# Patient Record
Sex: MALE | Race: WHITE | Employment: OTHER | URBAN - METROPOLITAN AREA
[De-identification: names, ages, dates, MRNs, and addresses within clinical notes are randomized per-mention and may not be internally consistent; named-entity substitution may affect disease eponyms.]

---

## 2023-11-05 ENCOUNTER — HOSPITAL ENCOUNTER (EMERGENCY)
Age: 78
Discharge: HOME | End: 2023-11-05
Payer: MEDICARE

## 2023-11-05 VITALS
HEIGHT: 70 IN | BODY MASS INDEX: 34.36 KG/M2 | SYSTOLIC BLOOD PRESSURE: 189 MMHG | TEMPERATURE: 97.4 F | WEIGHT: 240 LBS | DIASTOLIC BLOOD PRESSURE: 94 MMHG | OXYGEN SATURATION: 96 % | HEART RATE: 60 BPM | RESPIRATION RATE: 14 BRPM

## 2023-11-05 DIAGNOSIS — H60.391 INFECTIVE OTITIS EXTERNA OF RIGHT EAR: Primary | ICD-10-CM

## 2023-11-05 PROCEDURE — 99283 EMERGENCY DEPT VISIT LOW MDM: CPT

## 2023-11-05 RX ORDER — LEVOTHYROXINE SODIUM 0.05 MG/1
TABLET ORAL
COMMUNITY
Start: 2023-09-23

## 2023-11-05 RX ORDER — AMLODIPINE BESYLATE 5 MG/1
TABLET ORAL
COMMUNITY
Start: 2023-09-07

## 2023-11-05 RX ORDER — APIXABAN 5 MG/1
TABLET, FILM COATED ORAL
COMMUNITY
Start: 2023-09-23

## 2023-11-05 RX ORDER — HYDROCODONE BITARTRATE AND ACETAMINOPHEN 5; 325 MG/1; MG/1
TABLET ORAL
COMMUNITY
Start: 2023-10-20

## 2023-11-05 RX ORDER — LISINOPRIL 2.5 MG/1
TABLET ORAL
COMMUNITY
Start: 2023-09-23

## 2023-11-05 RX ORDER — TADALAFIL 5 MG/1
5 TABLET ORAL DAILY
COMMUNITY
Start: 2023-09-13

## 2023-11-05 RX ORDER — ATORVASTATIN CALCIUM 80 MG/1
1 TABLET, FILM COATED ORAL
COMMUNITY
Start: 2020-08-04

## 2023-11-05 RX ORDER — TAMSULOSIN HYDROCHLORIDE 0.4 MG/1
0.8 CAPSULE ORAL DAILY
COMMUNITY
Start: 2023-07-22

## 2023-11-05 RX ORDER — OMEPRAZOLE 40 MG/1
CAPSULE, DELAYED RELEASE ORAL
COMMUNITY
Start: 2023-09-23

## 2023-11-05 RX ORDER — FENOFIBRATE 160 MG/1
TABLET ORAL
COMMUNITY
Start: 2023-11-01

## 2023-11-05 RX ORDER — ASPIRIN 81 MG/1
81 TABLET, CHEWABLE ORAL DAILY
COMMUNITY

## 2023-11-05 RX ORDER — AMOXICILLIN AND CLAVULANATE POTASSIUM 875; 125 MG/1; MG/1
TABLET, FILM COATED ORAL
COMMUNITY
Start: 2023-11-02

## 2023-11-05 RX ORDER — HYDROCODONE BITARTRATE AND ACETAMINOPHEN 5; 325 MG/1; MG/1
1 TABLET ORAL EVERY 6 HOURS PRN
Qty: 10 TABLET | Refills: 0 | Status: SHIPPED | OUTPATIENT
Start: 2023-11-05 | End: 2023-11-08

## 2023-11-05 RX ORDER — FINASTERIDE 5 MG/1
5 TABLET, FILM COATED ORAL DAILY
COMMUNITY

## 2023-11-05 ASSESSMENT — PAIN - FUNCTIONAL ASSESSMENT
PAIN_FUNCTIONAL_ASSESSMENT: NONE - DENIES PAIN
PAIN_FUNCTIONAL_ASSESSMENT: 0-10

## 2023-11-05 ASSESSMENT — PAIN DESCRIPTION - PAIN TYPE: TYPE: ACUTE PAIN

## 2023-11-05 ASSESSMENT — PAIN DESCRIPTION - ORIENTATION: ORIENTATION: RIGHT

## 2023-11-05 ASSESSMENT — PAIN SCALES - GENERAL: PAINLEVEL_OUTOF10: 9

## 2023-11-05 ASSESSMENT — PAIN DESCRIPTION - LOCATION: LOCATION: EAR

## 2023-11-05 NOTE — ED NOTES
Discharge instructions reviewed without questions. No further needs voiced at this time. Ambulatory from department in stable condition.        Kristy Wilks RN  11/05/23 1038

## 2023-11-10 NOTE — ED PROVIDER NOTES
[address] [place] [address]  ED  EMERGENCY DEPARTMENT ENCOUNTER        Pt Name: Sunday Johnson  MRN: [id number]  [address] [facility] 1945  Date of evaluation: 11/5/2023  Provider: TRACIE Mirza - CNP  PCP: Rosa Michelle MD        SIGIFREDO. I have evaluated this patient. CHIEF COMPLAINT       Chief Complaint   Patient presents with    Otalgia     Sx for 3 weeks. Has seen multiple people and has been on antibiotics. Pt was seen today already at an urgent care prior to coming to the ER       HISTORY OF PRESENT ILLNESS: 1 or more Elements     History From: the patient  Limitations to history : None    Sunday Johnson is a 69 y.o. male who presents to the emergency room today with complaints of chronic ear pain, patient states that he had symptoms for several weeks, he is already been on several different antibiotics, he has seen ENT as well. Patient unable to get into ENT, he states that he is here to get some pain relief because he is going out of town. He is here for further evaluation. Nursing Notes were all reviewed and agreed with or any disagreements were addressed in the HPI. REVIEW OF SYSTEMS :      Review of Systems    Positives and Pertinent negatives as per HPI.      SURGICAL HISTORY     Past Surgical History:   Procedure Laterality Date    CARDIAC SURGERY         CURRENTMEDICATIONS       Discharge Medication List as of 11/5/2023 10:33 AM        CONTINUE these medications which have NOT CHANGED    Details   tamsulosin (FLOMAX) 0.4 MG capsule Take 2 capsules by mouth dailyHistorical Med      tadalafil (CIALIS) 5 MG tablet Take 1 tablet by mouth dailyHistorical Med      atorvastatin (LIPITOR) 80 MG tablet Take 1 tablet by mouthHistorical Med      finasteride (PROSCAR) 5 MG tablet Take 1 tablet by mouth dailyHistorical Med      ELIQUIS 5 MG TABS tablet DAWHistorical Med      levothyroxine (SYNTHROID) 50 MCG tablet Historical Med      lisinopril (PRINIVIL;ZESTRIL) 2.5 MG tablet

## 2023-12-14 ENCOUNTER — OFFICE VISIT (OUTPATIENT)
Dept: PRIMARY CARE CLINIC | Age: 78
End: 2023-12-14
Payer: MEDICARE

## 2023-12-14 VITALS
OXYGEN SATURATION: 98 % | BODY MASS INDEX: 35.76 KG/M2 | RESPIRATION RATE: 16 BRPM | WEIGHT: 249.8 LBS | DIASTOLIC BLOOD PRESSURE: 84 MMHG | TEMPERATURE: 98.3 F | SYSTOLIC BLOOD PRESSURE: 128 MMHG | HEIGHT: 70 IN | HEART RATE: 70 BPM

## 2023-12-14 DIAGNOSIS — E11.9 TYPE 2 DIABETES MELLITUS WITHOUT COMPLICATION, WITHOUT LONG-TERM CURRENT USE OF INSULIN (HCC): Primary | ICD-10-CM

## 2023-12-14 DIAGNOSIS — I10 ESSENTIAL HYPERTENSION: ICD-10-CM

## 2023-12-14 DIAGNOSIS — N40.0 BENIGN PROSTATIC HYPERPLASIA WITHOUT LOWER URINARY TRACT SYMPTOMS: ICD-10-CM

## 2023-12-14 DIAGNOSIS — K21.9 GASTROESOPHAGEAL REFLUX DISEASE, UNSPECIFIED WHETHER ESOPHAGITIS PRESENT: ICD-10-CM

## 2023-12-14 DIAGNOSIS — I48.91 ATRIAL FIBRILLATION, UNSPECIFIED TYPE (HCC): ICD-10-CM

## 2023-12-14 DIAGNOSIS — I25.10 CORONARY ARTERY DISEASE INVOLVING NATIVE CORONARY ARTERY OF NATIVE HEART WITHOUT ANGINA PECTORIS: ICD-10-CM

## 2023-12-14 DIAGNOSIS — E78.2 MIXED HYPERLIPIDEMIA: ICD-10-CM

## 2023-12-14 DIAGNOSIS — Z23 NEED FOR TDAP VACCINATION: ICD-10-CM

## 2023-12-14 DIAGNOSIS — E03.9 ACQUIRED HYPOTHYROIDISM: ICD-10-CM

## 2023-12-14 PROCEDURE — G8417 CALC BMI ABV UP PARAM F/U: HCPCS | Performed by: INTERNAL MEDICINE

## 2023-12-14 PROCEDURE — 3074F SYST BP LT 130 MM HG: CPT | Performed by: INTERNAL MEDICINE

## 2023-12-14 PROCEDURE — G8427 DOCREV CUR MEDS BY ELIG CLIN: HCPCS | Performed by: INTERNAL MEDICINE

## 2023-12-14 PROCEDURE — 99204 OFFICE O/P NEW MOD 45 MIN: CPT | Performed by: INTERNAL MEDICINE

## 2023-12-14 PROCEDURE — 3079F DIAST BP 80-89 MM HG: CPT | Performed by: INTERNAL MEDICINE

## 2023-12-14 PROCEDURE — 1036F TOBACCO NON-USER: CPT | Performed by: INTERNAL MEDICINE

## 2023-12-14 PROCEDURE — G8484 FLU IMMUNIZE NO ADMIN: HCPCS | Performed by: INTERNAL MEDICINE

## 2023-12-14 PROCEDURE — 1123F ACP DISCUSS/DSCN MKR DOCD: CPT | Performed by: INTERNAL MEDICINE

## 2023-12-14 RX ORDER — LOSARTAN POTASSIUM 25 MG/1
25 TABLET ORAL DAILY
Qty: 30 TABLET | Refills: 0 | Status: SHIPPED | OUTPATIENT
Start: 2023-12-14

## 2023-12-14 RX ORDER — ZINC GLUCONATE 50 MG
TABLET ORAL
COMMUNITY

## 2023-12-14 RX ORDER — MULTIVIT-MIN/IRON/FOLIC ACID/K 18-600-40
CAPSULE ORAL 2 TIMES DAILY
COMMUNITY

## 2023-12-31 PROBLEM — I10 ESSENTIAL HYPERTENSION: Status: ACTIVE | Noted: 2023-12-31

## 2023-12-31 PROBLEM — E11.9 TYPE 2 DIABETES MELLITUS WITHOUT COMPLICATION, WITHOUT LONG-TERM CURRENT USE OF INSULIN (HCC): Status: ACTIVE | Noted: 2023-12-31

## 2023-12-31 PROBLEM — E03.9 ACQUIRED HYPOTHYROIDISM: Status: ACTIVE | Noted: 2023-12-31

## 2023-12-31 PROBLEM — N40.0 BENIGN PROSTATIC HYPERPLASIA WITHOUT LOWER URINARY TRACT SYMPTOMS: Status: ACTIVE | Noted: 2023-12-31

## 2023-12-31 PROBLEM — E78.2 MIXED HYPERLIPIDEMIA: Status: ACTIVE | Noted: 2023-12-31

## 2023-12-31 PROBLEM — I25.10 CORONARY ARTERY DISEASE INVOLVING NATIVE CORONARY ARTERY OF NATIVE HEART WITHOUT ANGINA PECTORIS: Status: ACTIVE | Noted: 2023-12-31

## 2023-12-31 PROBLEM — I48.91 ATRIAL FIBRILLATION (HCC): Status: ACTIVE | Noted: 2023-12-31

## 2023-12-31 PROBLEM — K21.9 GASTROESOPHAGEAL REFLUX DISEASE: Status: ACTIVE | Noted: 2023-12-31

## 2024-01-22 NOTE — PROGRESS NOTES
tablet by mouth (Patient not taking: Reported on 1/23/2024)      amoxicillin-clavulanate (AUGMENTIN) 875-125 MG per tablet  (Patient not taking: Reported on 1/23/2024)       No facility-administered encounter medications on file as of 1/23/2024.        Lab Data:  CBC:   No results found for: \"WBC\", \"HGB\", \"HCT\", \"MCV\", \"PLT\"    BMP:   Lab Results   Component Value Date/Time     12/18/2023 09:56 AM    K 3.9 12/18/2023 09:56 AM     12/18/2023 09:56 AM    CO2 27 12/18/2023 09:56 AM    BUN 21 12/18/2023 09:56 AM    CREATININE 1.2 12/18/2023 09:56 AM    GLUCOSE 83 12/18/2023 09:56 AM    CALCIUM 8.8 12/18/2023 09:56 AM    LABGLOM >60 12/18/2023 09:56 AM        LIVER PROFILE:   Lab Results   Component Value Date    ALT 18 12/18/2023    AST 22 12/18/2023    ALKPHOS 42 12/18/2023    BILITOT 0.5 12/18/2023       LIPID:   Lab Results   Component Value Date    CHOL 113 12/18/2023     Lab Results   Component Value Date    TRIG 196 (H) 12/18/2023     Lab Results   Component Value Date    HDL 24 (L) 12/18/2023     Lab Results   Component Value Date    LDLCALC 50 12/18/2023     Lab Results   Component Value Date    LABVLDL 39 12/18/2023     No results found for: \"CHOLHDLRATIO\"  PT/INR: No results for input(s): \"PROTIME\", \"INR\" in the last 72 hours.  A1C: No results found for: \"LABA1C\"  BNP:  No results for input(s): \"BNP\" in the last 72 hours.    IMAGING:   EKG 1/23/24  NSR 69 bpm   Within normal limits  Assessment:  1. Essential hypertension BP borderline High but was 128/84 on 12.14.23 at PCP office will continue losartan and he is scheduled for follow up with them soon.  Continue amlodipine 5 mg once daily   -Continue Losartan  cough is better   2. Coronary artery disease involving native coronary artery of native heart without angina pectoris   -Continue atorvastatin 80 mg once daily  -Continue aspirin 81 mg once daily   Will continue to monitor his progress no indication for stress test at this time     3. Proximal

## 2024-01-23 ENCOUNTER — OFFICE VISIT (OUTPATIENT)
Dept: CARDIOLOGY CLINIC | Age: 79
End: 2024-01-23
Payer: MEDICARE

## 2024-01-23 VITALS
DIASTOLIC BLOOD PRESSURE: 80 MMHG | HEART RATE: 74 BPM | BODY MASS INDEX: 36.79 KG/M2 | WEIGHT: 257 LBS | OXYGEN SATURATION: 96 % | HEIGHT: 70 IN | SYSTOLIC BLOOD PRESSURE: 145 MMHG

## 2024-01-23 DIAGNOSIS — R73.03 PRE-DIABETES: ICD-10-CM

## 2024-01-23 DIAGNOSIS — K21.9 GASTROESOPHAGEAL REFLUX DISEASE, UNSPECIFIED WHETHER ESOPHAGITIS PRESENT: ICD-10-CM

## 2024-01-23 DIAGNOSIS — I25.10 CORONARY ARTERY DISEASE INVOLVING NATIVE CORONARY ARTERY OF NATIVE HEART WITHOUT ANGINA PECTORIS: ICD-10-CM

## 2024-01-23 DIAGNOSIS — E11.9 TYPE 2 DIABETES MELLITUS WITHOUT COMPLICATION, WITHOUT LONG-TERM CURRENT USE OF INSULIN (HCC): ICD-10-CM

## 2024-01-23 DIAGNOSIS — I10 ESSENTIAL HYPERTENSION: Primary | ICD-10-CM

## 2024-01-23 DIAGNOSIS — Z76.89 ESTABLISHING CARE WITH NEW DOCTOR, ENCOUNTER FOR: ICD-10-CM

## 2024-01-23 DIAGNOSIS — I48.91 ATRIAL FIBRILLATION, UNSPECIFIED TYPE (HCC): ICD-10-CM

## 2024-01-23 DIAGNOSIS — E78.2 MIXED HYPERLIPIDEMIA: ICD-10-CM

## 2024-01-23 PROCEDURE — 99204 OFFICE O/P NEW MOD 45 MIN: CPT | Performed by: INTERNAL MEDICINE

## 2024-01-23 PROCEDURE — G8427 DOCREV CUR MEDS BY ELIG CLIN: HCPCS | Performed by: INTERNAL MEDICINE

## 2024-01-23 PROCEDURE — 3079F DIAST BP 80-89 MM HG: CPT | Performed by: INTERNAL MEDICINE

## 2024-01-23 PROCEDURE — G8484 FLU IMMUNIZE NO ADMIN: HCPCS | Performed by: INTERNAL MEDICINE

## 2024-01-23 PROCEDURE — G8417 CALC BMI ABV UP PARAM F/U: HCPCS | Performed by: INTERNAL MEDICINE

## 2024-01-23 PROCEDURE — 3077F SYST BP >= 140 MM HG: CPT | Performed by: INTERNAL MEDICINE

## 2024-01-23 PROCEDURE — 93000 ELECTROCARDIOGRAM COMPLETE: CPT | Performed by: INTERNAL MEDICINE

## 2024-01-23 RX ORDER — SIMVASTATIN 20 MG
20 TABLET ORAL NIGHTLY
COMMUNITY
End: 2024-01-23

## 2024-01-23 RX ORDER — ATORVASTATIN CALCIUM 80 MG/1
80 TABLET, FILM COATED ORAL DAILY
Qty: 90 TABLET | Refills: 3 | Status: SHIPPED | OUTPATIENT
Start: 2024-01-23

## 2024-01-23 RX ORDER — LISINOPRIL 2.5 MG/1
2.5 TABLET ORAL DAILY
COMMUNITY
Start: 2021-04-20 | End: 2024-01-23 | Stop reason: ALTCHOICE

## 2024-01-23 NOTE — PATIENT INSTRUCTIONS
Plan:  We will request your cardiac medical notes from [facility] in [place].   Current medications reviewed.  No changes at this time. Refills given as warranted.   Lab work from 12/18/23 reviewed  Patient wants to monitor heart rate on apple watch at this time. Will consider 30 cardiac monitor in 3 months time prior to next office visit.   Follow up with me in 6 months

## 2024-01-24 ENCOUNTER — OFFICE VISIT (OUTPATIENT)
Dept: PRIMARY CARE CLINIC | Age: 79
End: 2024-01-24
Payer: MEDICARE

## 2024-01-24 VITALS
RESPIRATION RATE: 16 BRPM | WEIGHT: 255 LBS | HEIGHT: 70 IN | SYSTOLIC BLOOD PRESSURE: 128 MMHG | BODY MASS INDEX: 36.51 KG/M2 | DIASTOLIC BLOOD PRESSURE: 88 MMHG | TEMPERATURE: 97.7 F | OXYGEN SATURATION: 95 % | HEART RATE: 72 BPM

## 2024-01-24 DIAGNOSIS — N52.9 ERECTILE DYSFUNCTION, UNSPECIFIED ERECTILE DYSFUNCTION TYPE: ICD-10-CM

## 2024-01-24 DIAGNOSIS — T46.4X5A COUGH DUE TO ACE INHIBITOR: ICD-10-CM

## 2024-01-24 DIAGNOSIS — Z23 NEED FOR TDAP VACCINATION: ICD-10-CM

## 2024-01-24 DIAGNOSIS — I10 ESSENTIAL HYPERTENSION: Primary | ICD-10-CM

## 2024-01-24 DIAGNOSIS — N40.0 BENIGN PROSTATIC HYPERPLASIA WITHOUT LOWER URINARY TRACT SYMPTOMS: ICD-10-CM

## 2024-01-24 DIAGNOSIS — R80.9 MICROALBUMINURIA: ICD-10-CM

## 2024-01-24 DIAGNOSIS — R05.8 COUGH DUE TO ACE INHIBITOR: ICD-10-CM

## 2024-01-24 PROCEDURE — 1123F ACP DISCUSS/DSCN MKR DOCD: CPT | Performed by: INTERNAL MEDICINE

## 2024-01-24 PROCEDURE — 3079F DIAST BP 80-89 MM HG: CPT | Performed by: INTERNAL MEDICINE

## 2024-01-24 PROCEDURE — G8484 FLU IMMUNIZE NO ADMIN: HCPCS | Performed by: INTERNAL MEDICINE

## 2024-01-24 PROCEDURE — G8417 CALC BMI ABV UP PARAM F/U: HCPCS | Performed by: INTERNAL MEDICINE

## 2024-01-24 PROCEDURE — G8427 DOCREV CUR MEDS BY ELIG CLIN: HCPCS | Performed by: INTERNAL MEDICINE

## 2024-01-24 PROCEDURE — 99214 OFFICE O/P EST MOD 30 MIN: CPT | Performed by: INTERNAL MEDICINE

## 2024-01-24 PROCEDURE — 1036F TOBACCO NON-USER: CPT | Performed by: INTERNAL MEDICINE

## 2024-01-24 PROCEDURE — 3074F SYST BP LT 130 MM HG: CPT | Performed by: INTERNAL MEDICINE

## 2024-01-24 RX ORDER — LOSARTAN POTASSIUM 25 MG/1
25 TABLET ORAL DAILY
Qty: 90 TABLET | Refills: 0 | Status: SHIPPED | OUTPATIENT
Start: 2024-01-24

## 2024-01-24 RX ORDER — TADALAFIL 20 MG/1
20 TABLET ORAL PRN
Qty: 30 TABLET | Refills: 0 | Status: SHIPPED | OUTPATIENT
Start: 2024-01-24

## 2024-01-24 RX ORDER — TADALAFIL 5 MG/1
5 TABLET ORAL DAILY
Qty: 90 TABLET | Refills: 0 | Status: SHIPPED | OUTPATIENT
Start: 2024-01-24 | End: 2024-01-25 | Stop reason: SDUPTHER

## 2024-01-24 ASSESSMENT — PATIENT HEALTH QUESTIONNAIRE - PHQ9
SUM OF ALL RESPONSES TO PHQ QUESTIONS 1-9: 0
SUM OF ALL RESPONSES TO PHQ9 QUESTIONS 1 & 2: 0
2. FEELING DOWN, DEPRESSED OR HOPELESS: 0
1. LITTLE INTEREST OR PLEASURE IN DOING THINGS: 0
SUM OF ALL RESPONSES TO PHQ QUESTIONS 1-9: 0
SUM OF ALL RESPONSES TO PHQ QUESTIONS 1-9: 0

## 2024-01-24 NOTE — PROGRESS NOTES
Albumin 12/18/2023 4.1     Albumin/Globulin Ratio 12/18/2023 2.0     Total Bilirubin 12/18/2023 0.5     Alkaline Phosphatase 12/18/2023 42     ALT 12/18/2023 18     AST 12/18/2023 22     Cholesterol, Total 12/18/2023 113     Triglycerides 12/18/2023 196 (H)     HDL 12/18/2023 24 (L)     LDL Calculated 12/18/2023 50     VLDL Cholesterol Calcula* 12/18/2023 39     Microalbumin, Random Uri* 12/18/2023 5.40 (H)     Creatinine, Ur 12/18/2023 66.0     Microalbumin Creatinine * 12/18/2023 81.8 (H)     PSA 12/18/2023 0.71            Medications, Allergies, Social history, Medical history, and results reviewed      An electronic signature was used to authenticate this note.    -Piper Veliz MD

## 2024-01-25 ENCOUNTER — PATIENT MESSAGE (OUTPATIENT)
Dept: PRIMARY CARE CLINIC | Age: 79
End: 2024-01-25

## 2024-01-25 DIAGNOSIS — N40.0 BENIGN PROSTATIC HYPERPLASIA WITHOUT LOWER URINARY TRACT SYMPTOMS: ICD-10-CM

## 2024-01-25 RX ORDER — TADALAFIL 5 MG/1
5 TABLET ORAL DAILY
Qty: 30 TABLET | Refills: 1 | Status: SHIPPED | OUTPATIENT
Start: 2024-01-25

## 2024-01-25 NOTE — TELEPHONE ENCOUNTER
From: Jimmy Hubbard  To: Dr. Piper Veliz  Sent: 1/25/2024 11:06 AM EST  Subject: Prescriptions     Dr Veliz  Got a message from [employer] that Tadalafil 5mg is not approved. Need you to send prescription to [facility].  Let me know when it’s done and I can .  Thanks  Jimmy Hubbard

## 2024-02-08 PROBLEM — R05.8 COUGH DUE TO ACE INHIBITOR: Status: ACTIVE | Noted: 2024-02-08

## 2024-02-08 PROBLEM — N52.9 ERECTILE DYSFUNCTION: Status: ACTIVE | Noted: 2024-02-08

## 2024-02-08 PROBLEM — R80.9 MICROALBUMINURIA: Status: ACTIVE | Noted: 2024-02-08

## 2024-02-08 PROBLEM — T46.4X5A COUGH DUE TO ACE INHIBITOR: Status: ACTIVE | Noted: 2024-02-08

## 2024-02-08 ASSESSMENT — ENCOUNTER SYMPTOMS
BLOOD IN STOOL: 0
SINUS PRESSURE: 0
DIARRHEA: 0
COUGH: 1
TROUBLE SWALLOWING: 0
CONSTIPATION: 0
WHEEZING: 0
SHORTNESS OF BREATH: 0
SORE THROAT: 0
NAUSEA: 0
CHEST TIGHTNESS: 0
SINUS PAIN: 0
ABDOMINAL PAIN: 0
VOMITING: 0
RHINORRHEA: 0

## 2024-02-23 ENCOUNTER — TELEPHONE (OUTPATIENT)
Dept: PRIMARY CARE CLINIC | Age: 79
End: 2024-02-23

## 2024-02-23 NOTE — TELEPHONE ENCOUNTER
[facility] [address] [facility] calling to see if the genetic testing form can be sent back to them today   Fax # [phone]

## 2024-02-23 NOTE — TELEPHONE ENCOUNTER
Dr. Veliz wanted to verify that patient wants this testing. He did return call and agree to it. Form placed in Dr. Veliz's inbox for review.

## 2024-03-26 RX ORDER — LOSARTAN POTASSIUM 25 MG/1
25 TABLET ORAL DAILY
Qty: 90 TABLET | Refills: 2 | Status: SHIPPED | OUTPATIENT
Start: 2024-03-26

## 2024-03-26 NOTE — TELEPHONE ENCOUNTER
Medication:   Requested Prescriptions     Pending Prescriptions Disp Refills    losartan (COZAAR) 25 MG tablet [Pharmacy Med Name: Losartan Potassium 25 MG Oral Tablet] 90 tablet 3     Sig: TAKE 1 TABLET BY MOUTH DAILY     Last Filled:  1.24.24    Last appt: 1/24/2024   Next appt: 4/25/2024    Last OARRS:        No data to display

## 2024-04-15 ENCOUNTER — TELEPHONE (OUTPATIENT)
Dept: PRIMARY CARE CLINIC | Age: 79
End: 2024-04-15

## 2024-04-25 ENCOUNTER — OFFICE VISIT (OUTPATIENT)
Dept: PRIMARY CARE CLINIC | Age: 79
End: 2024-04-25
Payer: MEDICARE

## 2024-04-25 VITALS
HEART RATE: 76 BPM | WEIGHT: 247 LBS | RESPIRATION RATE: 16 BRPM | HEIGHT: 70 IN | TEMPERATURE: 97.4 F | DIASTOLIC BLOOD PRESSURE: 86 MMHG | BODY MASS INDEX: 35.36 KG/M2 | OXYGEN SATURATION: 96 % | SYSTOLIC BLOOD PRESSURE: 130 MMHG

## 2024-04-25 DIAGNOSIS — E03.9 ACQUIRED HYPOTHYROIDISM: ICD-10-CM

## 2024-04-25 DIAGNOSIS — E11.9 TYPE 2 DIABETES MELLITUS WITHOUT COMPLICATION, WITHOUT LONG-TERM CURRENT USE OF INSULIN (HCC): ICD-10-CM

## 2024-04-25 DIAGNOSIS — N52.9 ERECTILE DYSFUNCTION, UNSPECIFIED ERECTILE DYSFUNCTION TYPE: ICD-10-CM

## 2024-04-25 DIAGNOSIS — Z00.00 INITIAL MEDICARE ANNUAL WELLNESS VISIT: Primary | ICD-10-CM

## 2024-04-25 DIAGNOSIS — I10 ESSENTIAL HYPERTENSION: ICD-10-CM

## 2024-04-25 DIAGNOSIS — R80.9 MICROALBUMINURIA: ICD-10-CM

## 2024-04-25 DIAGNOSIS — E78.2 MIXED HYPERLIPIDEMIA: ICD-10-CM

## 2024-04-25 PROCEDURE — 1123F ACP DISCUSS/DSCN MKR DOCD: CPT | Performed by: INTERNAL MEDICINE

## 2024-04-25 PROCEDURE — G0438 PPPS, INITIAL VISIT: HCPCS | Performed by: INTERNAL MEDICINE

## 2024-04-25 PROCEDURE — 3079F DIAST BP 80-89 MM HG: CPT | Performed by: INTERNAL MEDICINE

## 2024-04-25 PROCEDURE — 3075F SYST BP GE 130 - 139MM HG: CPT | Performed by: INTERNAL MEDICINE

## 2024-04-25 PROCEDURE — 3044F HG A1C LEVEL LT 7.0%: CPT | Performed by: INTERNAL MEDICINE

## 2024-04-25 ASSESSMENT — LIFESTYLE VARIABLES
HOW MANY STANDARD DRINKS CONTAINING ALCOHOL DO YOU HAVE ON A TYPICAL DAY: PATIENT DOES NOT DRINK
HOW OFTEN DO YOU HAVE A DRINK CONTAINING ALCOHOL: NEVER

## 2024-04-25 ASSESSMENT — PATIENT HEALTH QUESTIONNAIRE - PHQ9
SUM OF ALL RESPONSES TO PHQ9 QUESTIONS 1 & 2: 0
1. LITTLE INTEREST OR PLEASURE IN DOING THINGS: NOT AT ALL
SUM OF ALL RESPONSES TO PHQ QUESTIONS 1-9: 0
2. FEELING DOWN, DEPRESSED OR HOPELESS: NOT AT ALL

## 2024-04-25 NOTE — PROGRESS NOTES
Medicare Annual Wellness Visit    Jimmy Hubbard is here for Medicare AWV    Assessment & Plan     1. Initial Medicare annual wellness visit  -Medicare AWV done    2. Essential hypertension  -Stable  -Continue amlodipine 5 mg once daily   -Continue losartan 25 mg once daily  -Low sodium diet  -Regular aerobic exercise   -Comprehensive Metabolic Panel; Future    3. Type 2 diabetes mellitus without complication, without long-term current use of insulin (HCC)  -stable  -Continue diet control  -Limit carbohydrates to 45 grams with meals and 15 grams with snacks  -monitor blood sugars  -goal for blood sugar fasting or pre-meal  is   -goal for blood sugar 2 hours after a meal is less than 180  -goal for blood sugar at bedtime is less than 150  -Regular aerobic exercise  -Comprehensive Metabolic Panel; Future  -Hemoglobin A1C; Future    4. Mixed hyperlipidemia  -stable  -LDL is at goal   -Continue atorvastatin 80 mg daily  -Low fat, low cholesterol diet  -Regular aerobic exercise  -Comprehensive Metabolic Panel; Future  -Lipid Panel; Future    5. Acquired hypothyroidism  -stable  -Continue levothyroxine 50 mcg once daily  -TSH; Future    6. Erectile dysfunction, unspecified erectile dysfunction type  -stable  -continue Cialis as needed    7. Microalbuminuria  -Continue Losartan 25mg once daily  -Microalbumin / Creatinine Urine Ratio; Future        Recommendations for Preventive Services Due: see orders and patient instructions/AVS.  Recommended screening schedule for the next 5-10 years is provided to the patient in written form: see Patient Instructions/AVS.         Return in about 6 months (around 10/25/2024) for diabetes, hypertension, hyperlipidemia, hypothyroidism, .           Subjective   The following acute and/or chronic problems were also addressed today:    Patient has Type 2 Diabetes. Patient is diet controlled. Patient decreases carbohydrates.     Patient has hypertension. Patient takes amlodipine 5

## 2024-04-30 ENCOUNTER — HOSPITAL ENCOUNTER (OUTPATIENT)
Age: 79
Discharge: HOME | End: 2024-04-30
Payer: MEDICARE

## 2024-04-30 DIAGNOSIS — I10 ESSENTIAL HYPERTENSION: ICD-10-CM

## 2024-04-30 DIAGNOSIS — E11.9 TYPE 2 DIABETES MELLITUS WITHOUT COMPLICATION, WITHOUT LONG-TERM CURRENT USE OF INSULIN (HCC): ICD-10-CM

## 2024-04-30 DIAGNOSIS — E78.2 MIXED HYPERLIPIDEMIA: ICD-10-CM

## 2024-04-30 DIAGNOSIS — E03.9 ACQUIRED HYPOTHYROIDISM: ICD-10-CM

## 2024-04-30 DIAGNOSIS — R80.9 MICROALBUMINURIA: ICD-10-CM

## 2024-04-30 LAB
ALBUMIN SERPL-MCNC: 4.1 G/DL (ref 3.4–5)
ALBUMIN/GLOB SERPL: 1.7 {RATIO} (ref 1.1–2.2)
ALP SERPL-CCNC: 40 U/L (ref 40–129)
ALT SERPL-CCNC: 24 U/L (ref 10–40)
ANION GAP SERPL CALCULATED.3IONS-SCNC: 8 MMOL/L (ref 3–16)
AST SERPL-CCNC: 21 U/L (ref 15–37)
BILIRUB SERPL-MCNC: 0.5 MG/DL (ref 0–1)
BUN SERPL-MCNC: 20 MG/DL (ref 7–20)
CALCIUM SERPL-MCNC: 9.4 MG/DL (ref 8.3–10.6)
CHLORIDE SERPL-SCNC: 102 MMOL/L (ref 99–110)
CHOLEST SERPL-MCNC: 152 MG/DL (ref 0–199)
CO2 SERPL-SCNC: 27 MMOL/L (ref 21–32)
CREAT SERPL-MCNC: 1.3 MG/DL (ref 0.8–1.3)
CREAT UR-MCNC: 58.2 MG/DL (ref 39–259)
GFR SERPLBLD CREATININE-BSD FMLA CKD-EPI: 56 ML/MIN/{1.73_M2}
GLUCOSE SERPL-MCNC: 119 MG/DL (ref 70–99)
HDLC SERPL-MCNC: 31 MG/DL (ref 40–60)
LDLC SERPL CALC-MCNC: 87 MG/DL
MICROALBUMIN UR DL<=1MG/L-MCNC: 4.6 MG/DL
MICROALBUMIN/CREAT UR: 79 MG/G (ref 0–30)
POTASSIUM SERPL-SCNC: 4.3 MMOL/L (ref 3.5–5.1)
PROT SERPL-MCNC: 6.5 G/DL (ref 6.4–8.2)
SODIUM SERPL-SCNC: 137 MMOL/L (ref 136–145)
TRIGL SERPL-MCNC: 170 MG/DL (ref 0–150)
TSH SERPL DL<=0.005 MIU/L-ACNC: 2.77 UIU/ML (ref 0.27–4.2)
VLDLC SERPL CALC-MCNC: 34 MG/DL

## 2024-04-30 PROCEDURE — 82570 ASSAY OF URINE CREATININE: CPT

## 2024-04-30 PROCEDURE — 80061 LIPID PANEL: CPT

## 2024-04-30 PROCEDURE — 36415 COLL VENOUS BLD VENIPUNCTURE: CPT

## 2024-04-30 PROCEDURE — 83036 HEMOGLOBIN GLYCOSYLATED A1C: CPT

## 2024-04-30 PROCEDURE — 82043 UR ALBUMIN QUANTITATIVE: CPT

## 2024-04-30 PROCEDURE — 84443 ASSAY THYROID STIM HORMONE: CPT

## 2024-04-30 PROCEDURE — 80053 COMPREHEN METABOLIC PANEL: CPT

## 2024-05-01 LAB
EST. AVERAGE GLUCOSE BLD GHB EST-MCNC: 131.2 MG/DL
HBA1C MFR BLD: 6.2 %

## 2024-05-07 ENCOUNTER — PATIENT MESSAGE (OUTPATIENT)
Dept: PRIMARY CARE CLINIC | Age: 79
End: 2024-05-07

## 2024-05-07 RX ORDER — LEVOTHYROXINE SODIUM 0.05 MG/1
50 TABLET ORAL DAILY
Qty: 90 TABLET | Refills: 1 | Status: SHIPPED | OUTPATIENT
Start: 2024-05-07

## 2024-05-07 NOTE — TELEPHONE ENCOUNTER
Medication:   Requested Prescriptions     Pending Prescriptions Disp Refills    levothyroxine (SYNTHROID) 50 MCG tablet 90 tablet 1     Sig: Take 1 tablet by mouth Daily     Last Filled:  9.23.23    Last appt: 4/25/2024   Next appt: 10/28/2024    Last OARRS:        No data to display

## 2024-05-07 NOTE — TELEPHONE ENCOUNTER
From: Jimmy Hubbard  To: Dr. Piper Veliz  Sent: 5/7/2024 2:21 PM EDT  Subject: levothyroxine 50 MCG tablet Refill    Got message from [facility] that this prescription needs to have your approval for a refill. Can you send them a new prescription? Thanks

## 2024-05-13 DIAGNOSIS — E11.9 TYPE 2 DIABETES MELLITUS WITHOUT COMPLICATION, WITHOUT LONG-TERM CURRENT USE OF INSULIN (HCC): ICD-10-CM

## 2024-05-13 DIAGNOSIS — R80.9 MICROALBUMINURIA: Primary | ICD-10-CM

## 2024-05-13 DIAGNOSIS — E78.2 MIXED HYPERLIPIDEMIA: ICD-10-CM

## 2024-05-13 DIAGNOSIS — I10 ESSENTIAL HYPERTENSION: ICD-10-CM

## 2024-05-13 DIAGNOSIS — E03.9 ACQUIRED HYPOTHYROIDISM: ICD-10-CM

## 2024-06-25 ENCOUNTER — TELEPHONE (OUTPATIENT)
Dept: CARDIOLOGY CLINIC | Age: 79
End: 2024-06-25

## 2024-06-25 NOTE — TELEPHONE ENCOUNTER
CARDIAC CLEARANCE REQUEST    What type of procedure are you having:  Epidural Steroid Injection in back   Are you taking any blood thinners:  Eliquis- wants held 3 days prior   Type on anesthesia:  Unknown  When is your procedure scheduled for:  Not scheduled yet   What physician is performing your procedure:  Dr. Bernardo Boothe   Phone Number:  [phone]  Fax number to send the letter:   [phone]

## 2024-06-25 NOTE — TELEPHONE ENCOUNTER
Oleksandr Chino, Monique Powell, RN  Caller: Unspecified (Today, 11:11 AM)  Okay to hold eliquis. For 72 hours as requested. Thanks.

## 2024-07-11 ENCOUNTER — HOSPITAL ENCOUNTER (OUTPATIENT)
Age: 79
Discharge: HOME | End: 2024-07-11
Payer: MEDICARE

## 2024-07-11 LAB
BILIRUB UR QL STRIP.AUTO: NEGATIVE
CLARITY UR: CLEAR
COLOR UR: YELLOW
GLUCOSE UR STRIP.AUTO-MCNC: NEGATIVE MG/DL
HGB UR QL STRIP.AUTO: NEGATIVE
KETONES UR STRIP.AUTO-MCNC: NEGATIVE MG/DL
LEUKOCYTE ESTERASE UR QL STRIP.AUTO: NEGATIVE
NITRITE UR QL STRIP.AUTO: NEGATIVE
PH UR STRIP.AUTO: 6 [PH] (ref 5–8)
PROT UR STRIP.AUTO-MCNC: NEGATIVE MG/DL
SP GR UR STRIP.AUTO: 1.02 (ref 1–1.03)
UA DIPSTICK W REFLEX MICRO PNL UR: NORMAL
URN SPEC COLLECT METH UR: NORMAL
UROBILINOGEN UR STRIP-ACNC: 0.2 E.U./DL

## 2024-07-11 PROCEDURE — 80048 BASIC METABOLIC PNL TOTAL CA: CPT

## 2024-07-11 PROCEDURE — 82570 ASSAY OF URINE CREATININE: CPT

## 2024-07-11 PROCEDURE — 82043 UR ALBUMIN QUANTITATIVE: CPT

## 2024-07-11 PROCEDURE — 85025 COMPLETE CBC W/AUTO DIFF WBC: CPT

## 2024-07-11 PROCEDURE — 81003 URINALYSIS AUTO W/O SCOPE: CPT

## 2024-07-11 PROCEDURE — 36415 COLL VENOUS BLD VENIPUNCTURE: CPT

## 2024-07-11 PROCEDURE — 84156 ASSAY OF PROTEIN URINE: CPT

## 2024-07-12 LAB
ANION GAP SERPL CALCULATED.3IONS-SCNC: 11 MMOL/L (ref 3–16)
BASOPHILS # BLD: 0.1 K/UL (ref 0–0.2)
BASOPHILS NFR BLD: 1.1 %
BUN SERPL-MCNC: 36 MG/DL (ref 7–20)
CALCIUM SERPL-MCNC: 9.9 MG/DL (ref 8.3–10.6)
CHLORIDE SERPL-SCNC: 102 MMOL/L (ref 99–110)
CO2 SERPL-SCNC: 27 MMOL/L (ref 21–32)
CREAT SERPL-MCNC: 1.7 MG/DL (ref 0.8–1.3)
CREAT UR-MCNC: 137.2 MG/DL (ref 39–259)
DEPRECATED RDW RBC AUTO: 14.5 % (ref 12.4–15.4)
EOSINOPHIL # BLD: 0.2 K/UL (ref 0–0.6)
EOSINOPHIL NFR BLD: 2.2 %
GFR SERPLBLD CREATININE-BSD FMLA CKD-EPI: 41 ML/MIN/{1.73_M2}
GLUCOSE SERPL-MCNC: 150 MG/DL (ref 70–99)
HCT VFR BLD AUTO: 41.2 % (ref 40.5–52.5)
HGB BLD-MCNC: 14.3 G/DL (ref 13.5–17.5)
LYMPHOCYTES # BLD: 2 K/UL (ref 1–5.1)
LYMPHOCYTES NFR BLD: 28.4 %
MCH RBC QN AUTO: 30.4 PG (ref 26–34)
MCHC RBC AUTO-ENTMCNC: 34.7 G/DL (ref 31–36)
MCV RBC AUTO: 87.7 FL (ref 80–100)
MICROALBUMIN UR DL<=1MG/L-MCNC: 4.7 MG/DL
MICROALBUMIN/CREAT UR: 34.3 MG/G (ref 0–30)
MONOCYTES # BLD: 0.6 K/UL (ref 0–1.3)
MONOCYTES NFR BLD: 7.9 %
NEUTROPHILS # BLD: 4.2 K/UL (ref 1.7–7.7)
NEUTROPHILS NFR BLD: 60.4 %
PLATELET # BLD AUTO: 255 K/UL (ref 135–450)
PMV BLD AUTO: 8.1 FL (ref 5–10.5)
POTASSIUM SERPL-SCNC: 4.3 MMOL/L (ref 3.5–5.1)
PROT UR-MCNC: 11 MG/DL
PROT/CREAT UR-RTO: 0.1 MG/DL
RBC # BLD AUTO: 4.7 M/UL (ref 4.2–5.9)
SODIUM SERPL-SCNC: 140 MMOL/L (ref 136–145)
WBC # BLD AUTO: 7 K/UL (ref 4–11)

## 2024-07-24 NOTE — PROGRESS NOTES
[facility]   Office consultation  Note  7/30/2024   Ref by Dr Piper Veliz MD  Subjective:  Mr. Hubbard presents today for cardiology follow up CAD,HBP,Mixed HLD, prediabetes, Hypothyroid. Post op afib  GERD      [place]:  Today he presents with his wife. Patient denies chest pain, sob, palpitations, dizziness or syncope. States monitors his Afib with his watch.  Shows 2% or less.  The patient is compliant with medications.  Cost of medications is affordable.  No endorsed side effects.  He reports his PCP increased his amlodipine to 10 mg daily.  States checking his BP at home and sometimes gets higher readings. He is not sure if his BP machine is correct.  Sometimes shows systolic 170 up to 200.  Has no symptoms. He is currently following with nephrology for his hypertension and worsening kidney function.         PMH:   Jimmy Hubbard is a 79 y.o. 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  Presented on 1/23/24 to establish cardiac care. \ Moved back to [place]. Prior care at [facility] in [facility].  No prior MI CAD found on routine CCTA.   Reports he underwent 3 vessel CABG in July 2021 at [facility] in [place]. Ongoing shortness of breath on exertion that is not new. Lisinopril stopped due to a cough. Taking Losartan.        Review of Systems:         12 point ROS negative in all areas as listed below except as in [place]  Constitutional, EENT, , GI, , Musculoskeletal, skin, neurological, hematological, endocrine, Psychiatric    Reviewed past medical history, social, and family history.   Smoking: exposed to second hand smoke as a child  Alcohol: no  Recreational Drugs: no  Family History:   Neg for premature CAD  He is a a retired .     Past Medical History:   Diagnosis Date    Atrial fibrillation (HCC)     BPH (benign prostatic

## 2024-07-30 ENCOUNTER — HOSPITAL ENCOUNTER (OUTPATIENT)
Age: 79
Discharge: HOME | End: 2024-07-30
Payer: MEDICARE

## 2024-07-30 ENCOUNTER — OFFICE VISIT (OUTPATIENT)
Dept: CARDIOLOGY CLINIC | Age: 79
End: 2024-07-30
Payer: MEDICARE

## 2024-07-30 VITALS
SYSTOLIC BLOOD PRESSURE: 130 MMHG | OXYGEN SATURATION: 96 % | BODY MASS INDEX: 35.93 KG/M2 | DIASTOLIC BLOOD PRESSURE: 72 MMHG | WEIGHT: 251 LBS | HEART RATE: 59 BPM | HEIGHT: 70 IN

## 2024-07-30 DIAGNOSIS — I25.10 CORONARY ARTERY DISEASE INVOLVING NATIVE CORONARY ARTERY OF NATIVE HEART WITHOUT ANGINA PECTORIS: Primary | ICD-10-CM

## 2024-07-30 DIAGNOSIS — E78.2 MIXED HYPERLIPIDEMIA: ICD-10-CM

## 2024-07-30 DIAGNOSIS — N18.31 STAGE 3A CHRONIC KIDNEY DISEASE (HCC): ICD-10-CM

## 2024-07-30 DIAGNOSIS — I10 ESSENTIAL HYPERTENSION: ICD-10-CM

## 2024-07-30 DIAGNOSIS — I48.0 PAROXYSMAL ATRIAL FIBRILLATION (HCC): ICD-10-CM

## 2024-07-30 DIAGNOSIS — Z95.1 HX OF THREE VESSEL CORONARY ARTERY BYPASS: ICD-10-CM

## 2024-07-30 LAB
ANION GAP SERPL CALCULATED.3IONS-SCNC: 11 MMOL/L (ref 3–16)
BUN SERPL-MCNC: 28 MG/DL (ref 7–20)
CALCIUM SERPL-MCNC: 9.7 MG/DL (ref 8.3–10.6)
CHLORIDE SERPL-SCNC: 103 MMOL/L (ref 99–110)
CO2 SERPL-SCNC: 26 MMOL/L (ref 21–32)
CREAT SERPL-MCNC: 1.3 MG/DL (ref 0.8–1.3)
GFR SERPLBLD CREATININE-BSD FMLA CKD-EPI: 56 ML/MIN/{1.73_M2}
GLUCOSE SERPL-MCNC: 131 MG/DL (ref 70–99)
POTASSIUM SERPL-SCNC: 4 MMOL/L (ref 3.5–5.1)
SODIUM SERPL-SCNC: 140 MMOL/L (ref 136–145)

## 2024-07-30 PROCEDURE — G8427 DOCREV CUR MEDS BY ELIG CLIN: HCPCS | Performed by: INTERNAL MEDICINE

## 2024-07-30 PROCEDURE — G8417 CALC BMI ABV UP PARAM F/U: HCPCS | Performed by: INTERNAL MEDICINE

## 2024-07-30 PROCEDURE — 1123F ACP DISCUSS/DSCN MKR DOCD: CPT | Performed by: INTERNAL MEDICINE

## 2024-07-30 PROCEDURE — 3078F DIAST BP <80 MM HG: CPT | Performed by: INTERNAL MEDICINE

## 2024-07-30 PROCEDURE — 3075F SYST BP GE 130 - 139MM HG: CPT | Performed by: INTERNAL MEDICINE

## 2024-07-30 PROCEDURE — 80048 BASIC METABOLIC PNL TOTAL CA: CPT

## 2024-07-30 PROCEDURE — 36415 COLL VENOUS BLD VENIPUNCTURE: CPT

## 2024-07-30 PROCEDURE — 1036F TOBACCO NON-USER: CPT | Performed by: INTERNAL MEDICINE

## 2024-07-30 PROCEDURE — 99214 OFFICE O/P EST MOD 30 MIN: CPT | Performed by: INTERNAL MEDICINE

## 2024-07-30 RX ORDER — FENOFIBRATE 160 MG/1
160 TABLET ORAL DAILY
Qty: 90 TABLET | Refills: 2 | Status: SHIPPED | OUTPATIENT
Start: 2024-07-30

## 2024-07-30 RX ORDER — APIXABAN 5 MG/1
5 TABLET, FILM COATED ORAL 2 TIMES DAILY
Qty: 180 TABLET | Refills: 2 | Status: SHIPPED | OUTPATIENT
Start: 2024-07-30

## 2024-08-30 ENCOUNTER — HOSPITAL ENCOUNTER (EMERGENCY)
Age: 79
Discharge: HOME | End: 2024-08-30
Attending: EMERGENCY MEDICINE
Payer: MEDICARE

## 2024-08-30 ENCOUNTER — APPOINTMENT (OUTPATIENT)
Dept: GENERAL RADIOLOGY | Age: 79
End: 2024-08-30
Payer: MEDICARE

## 2024-08-30 ENCOUNTER — HOSPITAL ENCOUNTER (EMERGENCY)
Dept: VASCULAR LAB | Age: 79
End: 2024-08-30
Attending: EMERGENCY MEDICINE
Payer: MEDICARE

## 2024-08-30 VITALS
SYSTOLIC BLOOD PRESSURE: 176 MMHG | DIASTOLIC BLOOD PRESSURE: 90 MMHG | HEIGHT: 70 IN | RESPIRATION RATE: 18 BRPM | HEART RATE: 77 BPM | OXYGEN SATURATION: 99 % | BODY MASS INDEX: 35.66 KG/M2 | WEIGHT: 249.1 LBS | TEMPERATURE: 98.3 F

## 2024-08-30 DIAGNOSIS — M79.604 RIGHT LEG PAIN: Primary | ICD-10-CM

## 2024-08-30 LAB — ECHO BSA: 2.36 M2

## 2024-08-30 PROCEDURE — 93971 EXTREMITY STUDY: CPT | Performed by: SURGERY

## 2024-08-30 PROCEDURE — 99284 EMERGENCY DEPT VISIT MOD MDM: CPT

## 2024-08-30 PROCEDURE — 93971 EXTREMITY STUDY: CPT

## 2024-08-30 PROCEDURE — 73590 X-RAY EXAM OF LOWER LEG: CPT

## 2024-08-30 ASSESSMENT — LIFESTYLE VARIABLES
HOW OFTEN DO YOU HAVE A DRINK CONTAINING ALCOHOL: NEVER
HOW MANY STANDARD DRINKS CONTAINING ALCOHOL DO YOU HAVE ON A TYPICAL DAY: PATIENT DOES NOT DRINK

## 2024-08-30 ASSESSMENT — PAIN SCALES - GENERAL: PAINLEVEL_OUTOF10: 7

## 2024-08-30 ASSESSMENT — PAIN - FUNCTIONAL ASSESSMENT: PAIN_FUNCTIONAL_ASSESSMENT: 0-10
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
## 2024-08-31 SDOH — HEALTH STABILITY: PHYSICAL HEALTH: ON AVERAGE, HOW MANY DAYS PER WEEK DO YOU ENGAGE IN MODERATE TO STRENUOUS EXERCISE (LIKE A BRISK WALK)?: 2 DAYS

## 2024-08-31 SDOH — HEALTH STABILITY: PHYSICAL HEALTH: ON AVERAGE, HOW MANY MINUTES DO YOU ENGAGE IN EXERCISE AT THIS LEVEL?: 30 MIN

## 2024-09-03 ENCOUNTER — OFFICE VISIT (OUTPATIENT)
Dept: ORTHOPEDIC SURGERY | Age: 79
End: 2024-09-03
Payer: MEDICARE

## 2024-09-03 VITALS — BODY MASS INDEX: 35.65 KG/M2 | HEIGHT: 70 IN | WEIGHT: 249 LBS

## 2024-09-03 DIAGNOSIS — M79.661 PAIN IN RIGHT SHIN: ICD-10-CM

## 2024-09-03 DIAGNOSIS — S80.11XA CONTUSION OF RIGHT LOWER EXTREMITY, INITIAL ENCOUNTER: Primary | ICD-10-CM

## 2024-09-03 PROCEDURE — 1123F ACP DISCUSS/DSCN MKR DOCD: CPT | Performed by: ORTHOPAEDIC SURGERY

## 2024-09-03 PROCEDURE — 99204 OFFICE O/P NEW MOD 45 MIN: CPT | Performed by: ORTHOPAEDIC SURGERY

## 2024-09-03 PROCEDURE — G8427 DOCREV CUR MEDS BY ELIG CLIN: HCPCS | Performed by: ORTHOPAEDIC SURGERY

## 2024-09-03 PROCEDURE — 1036F TOBACCO NON-USER: CPT | Performed by: ORTHOPAEDIC SURGERY

## 2024-09-03 PROCEDURE — G8417 CALC BMI ABV UP PARAM F/U: HCPCS | Performed by: ORTHOPAEDIC SURGERY

## 2024-09-03 RX ORDER — SULFAMETHOXAZOLE/TRIMETHOPRIM 800-160 MG
1 TABLET ORAL 2 TIMES DAILY
Qty: 20 TABLET | Refills: 0 | Status: SHIPPED | OUTPATIENT
Start: 2024-09-03

## 2024-09-03 RX ORDER — CYCLOBENZAPRINE HCL 10 MG
10 TABLET ORAL 3 TIMES DAILY PRN
Qty: 21 TABLET | Refills: 0 | Status: SHIPPED | OUTPATIENT
Start: 2024-09-03 | End: 2024-09-13

## 2024-09-03 NOTE — PROGRESS NOTES
nonoperative versus operative care with regard to risks and benefits associated with each with the above listed diagnoses, currently the patient wishes to proceed with conservative nonoperative care at this time.  I did review with him that this is primarily contusion related and somewhat exacerbated by him on Eliquis as an anticoagulant causing an additional hematoma I suspect however there is no fluctuance.  There is slight erythema however without streaking cellulitis.  I reviewed with him I like to start him on Septra strength 1 tab p.o. twice daily for the next 10 days.  This is somewhat to treat and head off any  early stage cellulitis that could be developing  -OTC Tylenol per bottle as needed discomfort   -Formal physical therapy has been prescribed to work on pericalf reconditioning and strengthening to include pain modalities and stretching   -I recommended activity modification to include low impact activity walking at this time  -I offered a walking boot to stress off the gastrocsoleus complex from the contusion however he does not wish for 1.  -Flexeril 10 mg p.o. every 8 hours as needed spasm  -All questions answered to the patient's satisfaction and the patient expressed understanding and agreement with the above listed treatment plan  -Follow up in 3 weeks time for clinical check  -Thank you for the clinical consultation and allowing me to participate in the patient's care.      Electronically signed by Damian Murphy MD on 9/3/24 at 1:32 PM EDT         Damian Murphy MD       Orthopaedic Surgery-Sports Medicine        Disclaimer:  This note was dictated with voice recognition software.  Though review and correction are routinely performed, please contact the office/medical records for any errors requiring correction.

## 2024-09-06 ENCOUNTER — HOSPITAL ENCOUNTER (OUTPATIENT)
Dept: PHYSICAL THERAPY | Age: 79
Setting detail: THERAPIES SERIES
Discharge: HOME | End: 2024-09-06
Payer: MEDICARE

## 2024-09-06 PROCEDURE — 97110 THERAPEUTIC EXERCISES: CPT | Performed by: PHYSICAL THERAPIST

## 2024-09-06 PROCEDURE — 97161 PT EVAL LOW COMPLEX 20 MIN: CPT | Performed by: PHYSICAL THERAPIST

## 2024-09-06 PROCEDURE — 97016 VASOPNEUMATIC DEVICE THERAPY: CPT | Performed by: PHYSICAL THERAPIST

## 2024-09-06 PROCEDURE — 97140 MANUAL THERAPY 1/> REGIONS: CPT | Performed by: PHYSICAL THERAPIST

## 2024-09-06 NOTE — PLAN OF CARE
Modalities:    Vasopneumatic Compression (Game Ready): Applied to  Lower leg/calf R  for significant edema, swelling, pain control for 15 minutes.  Relevant ICD-10 R22.4 Localized swelling of lower limb.   Girth Left Right Post Vaso   calf: 15 cm below patella 42 46.5 cm    Knee: 5 cm above      Knee: 15 cm above      Ankle: transmalleolar      Ankle: figure 8              Education/Home Exercise Program: Patient HEP program created electronically.  Refer to [employer] access code:    Access Code: [email or web address]  URL: [email or web address]  Date: 09/06/2024  Prepared by: Nicol Sandoval    Exercises  - Long Sitting Calf Stretch with Strap  - 2 x daily - 7 x weekly - 1 sets - 5 reps - 30 hold  - Long Sitting Soleus Stretch on Bolster with Strap  - 2 x daily - 7 x weekly - 1 sets - 5 reps - 30 hold  - Seated Toe Raise  - 2 x daily - 7 x weekly - 2-3 sets - 10 reps  - Seated Heel Raise  - 2 x daily - 7 x weekly - 2-3 sets - 10 reps  - Ankle Dorsiflexion with Resistance  - 2 x daily - 7 x weekly - 2-3 sets - 10 reps  - Ankle Eversion with Resistance  - 2 x daily - 7 x weekly - 2-3 sets - 10 reps  - Ankle Inversion with Resistance  - 2 x daily - 7 x weekly - 2-3 sets - 10 reps  - Long Sitting Ankle Plantar Flexion with Resistance  - 2 x daily - 7 x weekly - 2-3 sets - 10 reps  - Ankle Pumps in Elevation  - 2 x daily - 7 x weekly - 1 sets - 30 reps    ASSESSMENT   Assessment:   Jimmy Hubbard is a 79 y.o. male presenting today to Outpatient PT with signs and symptoms consistent with R Lower leg/calf edema, resulting in ankle stiffness and calf pain, and limiting functional gait and mobility.    Pt. presents with the functional impairments and activity limitations listed below and would benefit from Outpatient PT to address the below impairments as well as improve pain, and restore function.     Functional Impairments:   Decreased LE functional ROM  Decreased LE functional strength

## 2024-09-10 ENCOUNTER — HOSPITAL ENCOUNTER (OUTPATIENT)
Dept: PHYSICAL THERAPY | Age: 79
Setting detail: THERAPIES SERIES
Discharge: HOME | End: 2024-09-10
Payer: MEDICARE

## 2024-09-10 PROCEDURE — 97140 MANUAL THERAPY 1/> REGIONS: CPT | Performed by: PHYSICAL THERAPIST

## 2024-09-10 PROCEDURE — 97112 NEUROMUSCULAR REEDUCATION: CPT | Performed by: PHYSICAL THERAPIST

## 2024-09-10 PROCEDURE — 97110 THERAPEUTIC EXERCISES: CPT | Performed by: PHYSICAL THERAPIST

## 2024-09-10 PROCEDURE — 97016 VASOPNEUMATIC DEVICE THERAPY: CPT | Performed by: PHYSICAL THERAPIST

## 2024-09-13 ENCOUNTER — HOSPITAL ENCOUNTER (OUTPATIENT)
Dept: PHYSICAL THERAPY | Age: 79
Setting detail: THERAPIES SERIES
Discharge: HOME | End: 2024-09-13
Payer: MEDICARE

## 2024-09-13 PROCEDURE — 97110 THERAPEUTIC EXERCISES: CPT | Performed by: PHYSICAL THERAPIST

## 2024-09-13 PROCEDURE — 97016 VASOPNEUMATIC DEVICE THERAPY: CPT | Performed by: PHYSICAL THERAPIST

## 2024-09-13 PROCEDURE — 97140 MANUAL THERAPY 1/> REGIONS: CPT | Performed by: PHYSICAL THERAPIST

## 2024-09-17 ENCOUNTER — HOSPITAL ENCOUNTER (OUTPATIENT)
Dept: PHYSICAL THERAPY | Age: 79
Setting detail: THERAPIES SERIES
Discharge: HOME | End: 2024-09-17
Payer: MEDICARE

## 2024-09-17 PROCEDURE — 97140 MANUAL THERAPY 1/> REGIONS: CPT | Performed by: PHYSICAL THERAPIST

## 2024-09-17 PROCEDURE — 97110 THERAPEUTIC EXERCISES: CPT | Performed by: PHYSICAL THERAPIST

## 2024-09-19 ENCOUNTER — OFFICE VISIT (OUTPATIENT)
Dept: ORTHOPEDIC SURGERY | Age: 79
End: 2024-09-19
Payer: MEDICARE

## 2024-09-19 ENCOUNTER — TELEPHONE (OUTPATIENT)
Dept: PRIMARY CARE CLINIC | Age: 79
End: 2024-09-19

## 2024-09-19 ENCOUNTER — HOSPITAL ENCOUNTER (OUTPATIENT)
Dept: PHYSICAL THERAPY | Age: 79
Setting detail: THERAPIES SERIES
Discharge: HOME | End: 2024-09-19
Payer: MEDICARE

## 2024-09-19 VITALS — HEIGHT: 70 IN | BODY MASS INDEX: 35.65 KG/M2 | WEIGHT: 249 LBS

## 2024-09-19 DIAGNOSIS — S80.11XD CONTUSION OF RIGHT LOWER EXTREMITY, SUBSEQUENT ENCOUNTER: Primary | ICD-10-CM

## 2024-09-19 PROCEDURE — 1123F ACP DISCUSS/DSCN MKR DOCD: CPT | Performed by: ORTHOPAEDIC SURGERY

## 2024-09-19 PROCEDURE — 1036F TOBACCO NON-USER: CPT | Performed by: ORTHOPAEDIC SURGERY

## 2024-09-19 PROCEDURE — 97110 THERAPEUTIC EXERCISES: CPT | Performed by: PHYSICAL THERAPIST

## 2024-09-19 PROCEDURE — G8427 DOCREV CUR MEDS BY ELIG CLIN: HCPCS | Performed by: ORTHOPAEDIC SURGERY

## 2024-09-19 PROCEDURE — 97140 MANUAL THERAPY 1/> REGIONS: CPT | Performed by: PHYSICAL THERAPIST

## 2024-09-19 PROCEDURE — G8417 CALC BMI ABV UP PARAM F/U: HCPCS | Performed by: ORTHOPAEDIC SURGERY

## 2024-09-19 PROCEDURE — 99213 OFFICE O/P EST LOW 20 MIN: CPT | Performed by: ORTHOPAEDIC SURGERY

## 2024-09-23 ENCOUNTER — OFFICE VISIT (OUTPATIENT)
Dept: PRIMARY CARE CLINIC | Age: 79
End: 2024-09-23
Payer: MEDICARE

## 2024-09-23 VITALS
TEMPERATURE: 97.6 F | DIASTOLIC BLOOD PRESSURE: 86 MMHG | HEART RATE: 83 BPM | WEIGHT: 252 LBS | SYSTOLIC BLOOD PRESSURE: 132 MMHG | OXYGEN SATURATION: 95 % | BODY MASS INDEX: 36.16 KG/M2 | RESPIRATION RATE: 16 BRPM

## 2024-09-23 DIAGNOSIS — R73.03 PREDIABETES: ICD-10-CM

## 2024-09-23 DIAGNOSIS — Z95.1 HX OF THREE VESSEL CORONARY ARTERY BYPASS: ICD-10-CM

## 2024-09-23 DIAGNOSIS — I25.10 CORONARY ARTERY DISEASE INVOLVING NATIVE CORONARY ARTERY OF NATIVE HEART WITHOUT ANGINA PECTORIS: ICD-10-CM

## 2024-09-23 DIAGNOSIS — I48.0 PAROXYSMAL ATRIAL FIBRILLATION (HCC): ICD-10-CM

## 2024-09-23 DIAGNOSIS — E78.2 MIXED HYPERLIPIDEMIA: ICD-10-CM

## 2024-09-23 DIAGNOSIS — N18.31 STAGE 3A CHRONIC KIDNEY DISEASE (HCC): ICD-10-CM

## 2024-09-23 DIAGNOSIS — I10 ESSENTIAL HYPERTENSION: ICD-10-CM

## 2024-09-23 DIAGNOSIS — R60.0 BILATERAL LOWER EXTREMITY EDEMA: Primary | ICD-10-CM

## 2024-09-23 PROBLEM — E11.22 TYPE 2 DIABETES MELLITUS WITH CHRONIC KIDNEY DISEASE, WITHOUT LONG-TERM CURRENT USE OF INSULIN (HCC): Status: ACTIVE | Noted: 2023-12-31

## 2024-09-23 LAB
ALBUMIN SERPL-MCNC: 3.9 G/DL (ref 3.4–5)
ALBUMIN/GLOB SERPL: 2.1 {RATIO} (ref 1.1–2.2)
ALP SERPL-CCNC: 41 U/L (ref 40–129)
ALT SERPL-CCNC: 25 U/L (ref 10–40)
ANION GAP SERPL CALCULATED.3IONS-SCNC: 9 MMOL/L (ref 3–16)
AST SERPL-CCNC: 28 U/L (ref 15–37)
BASOPHILS # BLD: 0 K/UL (ref 0–0.2)
BASOPHILS NFR BLD: 0.9 %
BILIRUB SERPL-MCNC: 0.5 MG/DL (ref 0–1)
BUN SERPL-MCNC: 22 MG/DL (ref 7–20)
CALCIUM SERPL-MCNC: 9.3 MG/DL (ref 8.3–10.6)
CHLORIDE SERPL-SCNC: 104 MMOL/L (ref 99–110)
CO2 SERPL-SCNC: 23 MMOL/L (ref 21–32)
CREAT SERPL-MCNC: 1.6 MG/DL (ref 0.8–1.3)
DEPRECATED RDW RBC AUTO: 14.1 % (ref 12.4–15.4)
EOSINOPHIL # BLD: 0.1 K/UL (ref 0–0.6)
EOSINOPHIL NFR BLD: 3.1 %
GFR SERPLBLD CREATININE-BSD FMLA CKD-EPI: 44 ML/MIN/{1.73_M2}
GLUCOSE SERPL-MCNC: 151 MG/DL (ref 70–99)
HCT VFR BLD AUTO: 40.8 % (ref 40.5–52.5)
HGB BLD-MCNC: 13.7 G/DL (ref 13.5–17.5)
LYMPHOCYTES # BLD: 1.2 K/UL (ref 1–5.1)
LYMPHOCYTES NFR BLD: 25.3 %
MCH RBC QN AUTO: 29.1 PG (ref 26–34)
MCHC RBC AUTO-ENTMCNC: 33.7 G/DL (ref 31–36)
MCV RBC AUTO: 86.2 FL (ref 80–100)
MONOCYTES # BLD: 0.2 K/UL (ref 0–1.3)
MONOCYTES NFR BLD: 4.9 %
NEUTROPHILS # BLD: 3 K/UL (ref 1.7–7.7)
NEUTROPHILS NFR BLD: 65.8 %
PLATELET # BLD AUTO: 235 K/UL (ref 135–450)
PMV BLD AUTO: 7.8 FL (ref 5–10.5)
POTASSIUM SERPL-SCNC: 4.2 MMOL/L (ref 3.5–5.1)
PROT SERPL-MCNC: 5.8 G/DL (ref 6.4–8.2)
RBC # BLD AUTO: 4.73 M/UL (ref 4.2–5.9)
SODIUM SERPL-SCNC: 136 MMOL/L (ref 136–145)
TSH SERPL DL<=0.005 MIU/L-ACNC: 2.4 UIU/ML (ref 0.27–4.2)
WBC # BLD AUTO: 4.6 K/UL (ref 4–11)

## 2024-09-23 PROCEDURE — 99214 OFFICE O/P EST MOD 30 MIN: CPT | Performed by: NURSE PRACTITIONER

## 2024-09-23 PROCEDURE — 3075F SYST BP GE 130 - 139MM HG: CPT | Performed by: NURSE PRACTITIONER

## 2024-09-23 PROCEDURE — 1123F ACP DISCUSS/DSCN MKR DOCD: CPT | Performed by: NURSE PRACTITIONER

## 2024-09-23 PROCEDURE — G8427 DOCREV CUR MEDS BY ELIG CLIN: HCPCS | Performed by: NURSE PRACTITIONER

## 2024-09-23 PROCEDURE — 1036F TOBACCO NON-USER: CPT | Performed by: NURSE PRACTITIONER

## 2024-09-23 PROCEDURE — G8417 CALC BMI ABV UP PARAM F/U: HCPCS | Performed by: NURSE PRACTITIONER

## 2024-09-23 PROCEDURE — 3079F DIAST BP 80-89 MM HG: CPT | Performed by: NURSE PRACTITIONER

## 2024-09-23 ASSESSMENT — ENCOUNTER SYMPTOMS
NAUSEA: 0
WHEEZING: 0
SHORTNESS OF BREATH: 0
DIARRHEA: 0
VOMITING: 0
COUGH: 0

## 2024-09-24 LAB
EST. AVERAGE GLUCOSE BLD GHB EST-MCNC: 128.4 MG/DL
HBA1C MFR BLD: 6.1 %

## 2024-09-25 NOTE — RESULT ENCOUNTER NOTE
Jose Contreras MD  P [facility] Staff  Kidney function is slightly weak  encourage him to drink more water.  Minimum 2 quarts of liquids in 24 hrs.      Attempted to call pt, no answer. LMOVM for pt to return call back.

## 2024-10-28 ENCOUNTER — OFFICE VISIT (OUTPATIENT)
Dept: PRIMARY CARE CLINIC | Age: 79
End: 2024-10-28

## 2024-10-28 VITALS
WEIGHT: 254 LBS | DIASTOLIC BLOOD PRESSURE: 88 MMHG | TEMPERATURE: 97.7 F | RESPIRATION RATE: 16 BRPM | BODY MASS INDEX: 36.45 KG/M2 | OXYGEN SATURATION: 98 % | HEART RATE: 77 BPM | SYSTOLIC BLOOD PRESSURE: 150 MMHG

## 2024-10-28 DIAGNOSIS — S81.801A OPEN WOUND OF RIGHT LOWER LEG, INITIAL ENCOUNTER: Primary | ICD-10-CM

## 2024-10-28 DIAGNOSIS — L03.115 CELLULITIS OF RIGHT LOWER EXTREMITY: ICD-10-CM

## 2024-10-28 RX ORDER — CEPHALEXIN 500 MG/1
500 CAPSULE ORAL 3 TIMES DAILY
Qty: 21 CAPSULE | Refills: 0 | Status: SHIPPED | OUTPATIENT
Start: 2024-10-28 | End: 2024-11-04

## 2024-10-28 ASSESSMENT — ENCOUNTER SYMPTOMS
WHEEZING: 0
COUGH: 0
DIARRHEA: 0
SHORTNESS OF BREATH: 0
VOMITING: 0
NAUSEA: 0
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
## 2024-10-29 NOTE — PROGRESS NOTES
[facility] Brief Operative Note    Procedure:  Upper endoscopy   Post-operative diagnosis:  antral erosions   Staff Physician:  Dr. Reji Andujar   Fellow/Assistant(s):  Dr. Amy Tomlinson    Specimens:  Please see final procedure note for further details.   Findings:  normal esophagus, biopsied for EOE, multiple gastric antral erosions, biopsied for H. pylori, normal duodenum   Complications:  None.   Condition:  Stable   Recommendations  Diet:  Advance as tolerated.   PPI:  PPI po BID  Anti-coagulants/platelets:  N/A  Octreotide:  N/A  Discharge Planning:     Follow up pathologies.     Patient can be discharged home from [facility].           
Rhythm: Normal rate.   Pulmonary:      Effort: Pulmonary effort is normal. No respiratory distress.   Skin:     General: Skin is warm and dry.      Findings: Wound (Superficial wound on posterior right calf.  Improved surrounding erythema.  Some serous drainage..  No tenderness.  See photo) present. No rash.   Neurological:      Mental Status: He is alert and oriented to person, place, and time.   Psychiatric:         Mood and Affect: Mood normal.         Behavior: Behavior normal.              Patient Active Problem List   Diagnosis    Type 2 diabetes mellitus with chronic kidney disease, without long-term current use of insulin ([facility])    Essential hypertension    Mixed hyperlipidemia    Gastroesophageal reflux disease    Benign prostatic hyperplasia without lower urinary tract symptoms    Coronary artery disease involving native coronary artery of native heart without angina pectoris    Acquired hypothyroidism    Atrial fibrillation ([facility])    Cough due to ACE inhibitor    Erectile dysfunction    Microalbuminuria    Hx of three vessel coronary artery bypass    Stage 3a chronic kidney disease ([facility])      Allergies   Allergen Reactions    Niacin And Related      Current Outpatient Medications on File Prior to Visit   Medication Sig Dispense Refill    atorvastatin (LIPITOR) 80 MG tablet TAKE 1 TABLET BY MOUTH ONCE  DAILY 90 tablet 2    cephALEXin (KEFLEX) 500 MG capsule Take 1 capsule by mouth 3 times daily for 7 days 21 capsule 0    hydroCHLOROthiazide (HYDRODIURIL) 25 MG tablet Take 0.5 tablets by mouth every morning 30 tablet 1    sulfamethoxazole-trimethoprim (BACTRIM DS) 800-160 MG per tablet Take 1 tablet by mouth 2 times daily 20 tablet 0    ELIQUIS 5 MG TABS tablet Take 1 tablet by mouth 2 times daily 180 tablet 2    fenofibrate (TRIGLIDE) 160 MG tablet Take 1 tablet by mouth daily 90 tablet 2    losartan (COZAAR) 50 MG tablet Take 0.5 tablets by mouth daily 90 tablet 1    amLODIPine (NORVASC) 10 MG tablet Take 1

## 2024-10-30 LAB
BACTERIA SPEC AEROBE CULT: ABNORMAL
GRAM STN SPEC: ABNORMAL
ORGANISM: ABNORMAL

## 2024-10-30 RX ORDER — ATORVASTATIN CALCIUM 80 MG/1
80 TABLET, FILM COATED ORAL DAILY
Qty: 90 TABLET | Refills: 2 | Status: SHIPPED | OUTPATIENT
Start: 2024-10-30

## 2024-10-31 ENCOUNTER — OFFICE VISIT (OUTPATIENT)
Dept: PRIMARY CARE CLINIC | Age: 79
End: 2024-10-31

## 2024-10-31 VITALS
TEMPERATURE: 97.7 F | WEIGHT: 254 LBS | DIASTOLIC BLOOD PRESSURE: 80 MMHG | OXYGEN SATURATION: 95 % | HEART RATE: 72 BPM | RESPIRATION RATE: 16 BRPM | BODY MASS INDEX: 36.45 KG/M2 | SYSTOLIC BLOOD PRESSURE: 128 MMHG

## 2024-10-31 DIAGNOSIS — S81.801A OPEN WOUND OF RIGHT LOWER LEG, INITIAL ENCOUNTER: Primary | ICD-10-CM

## 2024-10-31 DIAGNOSIS — L03.115 CELLULITIS OF RIGHT LOWER EXTREMITY: ICD-10-CM

## 2024-10-31 ASSESSMENT — ENCOUNTER SYMPTOMS
VOMITING: 0
WHEEZING: 0
COUGH: 0
DIARRHEA: 0
NAUSEA: 0
SHORTNESS OF BREATH: 0

## 2024-11-04 DIAGNOSIS — E11.9 TYPE 2 DIABETES MELLITUS WITHOUT COMPLICATION, WITHOUT LONG-TERM CURRENT USE OF INSULIN (HCC): ICD-10-CM

## 2024-11-04 DIAGNOSIS — E78.2 MIXED HYPERLIPIDEMIA: ICD-10-CM

## 2024-11-04 DIAGNOSIS — E03.9 ACQUIRED HYPOTHYROIDISM: ICD-10-CM

## 2024-11-04 DIAGNOSIS — I10 ESSENTIAL HYPERTENSION: ICD-10-CM

## 2024-11-05 LAB
ALBUMIN SERPL-MCNC: 4.6 G/DL (ref 3.4–5)
ALBUMIN/GLOB SERPL: 2.4 {RATIO} (ref 1.1–2.2)
ALP SERPL-CCNC: 39 U/L (ref 40–129)
ALT SERPL-CCNC: 23 U/L (ref 10–40)
ANION GAP SERPL CALCULATED.3IONS-SCNC: 11 MMOL/L (ref 3–16)
AST SERPL-CCNC: 19 U/L (ref 15–37)
BILIRUB SERPL-MCNC: 0.6 MG/DL (ref 0–1)
BUN SERPL-MCNC: 31 MG/DL (ref 7–20)
CALCIUM SERPL-MCNC: 9.4 MG/DL (ref 8.3–10.6)
CHLORIDE SERPL-SCNC: 102 MMOL/L (ref 99–110)
CHOLEST SERPL-MCNC: 148 MG/DL (ref 0–199)
CO2 SERPL-SCNC: 27 MMOL/L (ref 21–32)
CREAT SERPL-MCNC: 1.4 MG/DL (ref 0.8–1.3)
EST. AVERAGE GLUCOSE BLD GHB EST-MCNC: 128.4 MG/DL
GFR SERPLBLD CREATININE-BSD FMLA CKD-EPI: 51 ML/MIN/{1.73_M2}
GLUCOSE SERPL-MCNC: 126 MG/DL (ref 70–99)
HBA1C MFR BLD: 6.1 %
HDLC SERPL-MCNC: 27 MG/DL (ref 40–60)
LDLC SERPL CALC-MCNC: 98 MG/DL
POTASSIUM SERPL-SCNC: 4.1 MMOL/L (ref 3.5–5.1)
PROT SERPL-MCNC: 6.5 G/DL (ref 6.4–8.2)
SODIUM SERPL-SCNC: 140 MMOL/L (ref 136–145)
TRIGL SERPL-MCNC: 115 MG/DL (ref 0–150)
TSH SERPL DL<=0.005 MIU/L-ACNC: 3.72 UIU/ML (ref 0.27–4.2)
VLDLC SERPL CALC-MCNC: 23 MG/DL

## 2024-11-07 ENCOUNTER — OFFICE VISIT (OUTPATIENT)
Dept: PRIMARY CARE CLINIC | Age: 79
End: 2024-11-07
Payer: MEDICARE

## 2024-11-07 VITALS
HEART RATE: 65 BPM | WEIGHT: 251 LBS | BODY MASS INDEX: 36.01 KG/M2 | DIASTOLIC BLOOD PRESSURE: 77 MMHG | OXYGEN SATURATION: 95 % | SYSTOLIC BLOOD PRESSURE: 118 MMHG

## 2024-11-07 DIAGNOSIS — N18.31 STAGE 3A CHRONIC KIDNEY DISEASE (HCC): ICD-10-CM

## 2024-11-07 DIAGNOSIS — H61.23 BILATERAL IMPACTED CERUMEN: ICD-10-CM

## 2024-11-07 DIAGNOSIS — Z23 NEED FOR TDAP VACCINATION: ICD-10-CM

## 2024-11-07 DIAGNOSIS — I10 ESSENTIAL HYPERTENSION: ICD-10-CM

## 2024-11-07 DIAGNOSIS — E03.9 ACQUIRED HYPOTHYROIDISM: ICD-10-CM

## 2024-11-07 DIAGNOSIS — E78.2 MIXED HYPERLIPIDEMIA: ICD-10-CM

## 2024-11-07 DIAGNOSIS — K21.9 GASTROESOPHAGEAL REFLUX DISEASE, UNSPECIFIED WHETHER ESOPHAGITIS PRESENT: ICD-10-CM

## 2024-11-07 DIAGNOSIS — E11.9 TYPE 2 DIABETES MELLITUS WITHOUT COMPLICATION, WITHOUT LONG-TERM CURRENT USE OF INSULIN (HCC): Primary | ICD-10-CM

## 2024-11-07 PROCEDURE — G8427 DOCREV CUR MEDS BY ELIG CLIN: HCPCS | Performed by: INTERNAL MEDICINE

## 2024-11-07 PROCEDURE — G8417 CALC BMI ABV UP PARAM F/U: HCPCS | Performed by: INTERNAL MEDICINE

## 2024-11-07 PROCEDURE — 3074F SYST BP LT 130 MM HG: CPT | Performed by: INTERNAL MEDICINE

## 2024-11-07 PROCEDURE — G8482 FLU IMMUNIZE ORDER/ADMIN: HCPCS | Performed by: INTERNAL MEDICINE

## 2024-11-07 PROCEDURE — 1123F ACP DISCUSS/DSCN MKR DOCD: CPT | Performed by: INTERNAL MEDICINE

## 2024-11-07 PROCEDURE — 3044F HG A1C LEVEL LT 7.0%: CPT | Performed by: INTERNAL MEDICINE

## 2024-11-07 PROCEDURE — 3078F DIAST BP <80 MM HG: CPT | Performed by: INTERNAL MEDICINE

## 2024-11-07 PROCEDURE — 99214 OFFICE O/P EST MOD 30 MIN: CPT | Performed by: INTERNAL MEDICINE

## 2024-11-07 PROCEDURE — 1036F TOBACCO NON-USER: CPT | Performed by: INTERNAL MEDICINE

## 2024-11-07 RX ORDER — ATORVASTATIN CALCIUM 80 MG/1
80 TABLET, FILM COATED ORAL DAILY
Qty: 90 TABLET | Refills: 2 | Status: SHIPPED | OUTPATIENT
Start: 2024-11-07

## 2024-11-07 NOTE — PATIENT INSTRUCTIONS
-Low sodium diet  -Low fat, low cholesterol diet  -low carbohydrate diet  -Regular aerobic exercise

## 2024-11-07 NOTE — PROGRESS NOTES
side of head: No submandibular adenopathy.   Neurological:      Mental Status: He is alert.   Psychiatric:         Mood and Affect: Mood normal.         No results found for this visit on 11/07/24.  Lab Review   Orders Only on 11/04/2024   Component Date Value    TSH 11/04/2024 3.72     Cholesterol, Total 11/04/2024 148     Triglycerides 11/04/2024 115     HDL 11/04/2024 27 (L)     LDL Cholesterol 11/04/2024 98     VLDL Cholesterol Calcula* 11/04/2024 23     Hemoglobin A1C 11/04/2024 6.1     Estimated Avg Glucose 11/04/2024 128.4     Sodium 11/04/2024 140     Potassium 11/04/2024 4.1     Chloride 11/04/2024 102     CO2 11/04/2024 27     Anion Gap 11/04/2024 11     Glucose 11/04/2024 126 (H)     BUN 11/04/2024 31 (H)     Creatinine 11/04/2024 1.4 (H)     Est, Glom Filt Rate 11/04/2024 51 (A)     Calcium 11/04/2024 9.4     Total Protein 11/04/2024 6.5     Albumin 11/04/2024 4.6     Albumin/Globulin Ratio 11/04/2024 2.4 (H)     Total Bilirubin 11/04/2024 0.6     Alkaline Phosphatase 11/04/2024 39 (L)     ALT 11/04/2024 23     AST 11/04/2024 19    Office Visit on 10/28/2024   Component Date Value    Gram Stain Result 10/28/2024                      Value:1+ Epithelial Cells  No WBCs or organisms seen      Organism 10/28/2024 Staphylococcus epidermidis (A)     WOUND/ABSCESS 10/28/2024                      Value:Rare growth  No further workup     Orders Only on 09/23/2024   Component Date Value    TSH Reflex FT4 09/23/2024 2.40     WBC 09/23/2024 4.6     RBC 09/23/2024 4.73     Hemoglobin 09/23/2024 13.7     Hematocrit 09/23/2024 40.8     MCV 09/23/2024 86.2     MCH 09/23/2024 29.1     MCHC 09/23/2024 33.7     RDW 09/23/2024 14.1     Platelets 09/23/2024 235     MPV 09/23/2024 7.8     Neutrophils % 09/23/2024 65.8     Lymphocytes % 09/23/2024 25.3     Monocytes % 09/23/2024 4.9     Eosinophils % 09/23/2024 3.1     Basophils % 09/23/2024 0.9     Neutrophils Absolute 09/23/2024 3.0     Lymphocytes Absolute 09/23/2024 1.2

## 2024-11-18 PROBLEM — E11.9 TYPE 2 DIABETES MELLITUS WITHOUT COMPLICATION, WITHOUT LONG-TERM CURRENT USE OF INSULIN (HCC): Status: ACTIVE | Noted: 2024-11-18

## 2024-11-18 PROBLEM — H61.23 BILATERAL IMPACTED CERUMEN: Status: ACTIVE | Noted: 2024-11-18

## 2024-11-18 PROBLEM — Z23 NEED FOR TDAP VACCINATION: Status: ACTIVE | Noted: 2024-11-18

## 2024-11-18 ASSESSMENT — ENCOUNTER SYMPTOMS
ABDOMINAL PAIN: 0
TROUBLE SWALLOWING: 0
RHINORRHEA: 0
SHORTNESS OF BREATH: 0
CONSTIPATION: 0
VOMITING: 0
NAUSEA: 0
CHEST TIGHTNESS: 0
SORE THROAT: 0
COUGH: 0
WHEEZING: 0
DIARRHEA: 0

## 2024-11-18 NOTE — ASSESSMENT & PLAN NOTE
-Stable  -Continue atorvastatin 80 mg once daily  -Continue fenofibrate 160 mg once daily  -Low fat, low cholesterol diet  -Regular aerobic exercise

## 2024-11-18 NOTE — ASSESSMENT & PLAN NOTE
-stable  -Continue losartan 50 mg 1/2 tablet once daily  -Continue amlodipine 10 mg once daily  -Continue HCTZ 25 mg 1/2 tablet once daily  -Low sodium diet  -Regular aerobic exercise

## 2024-11-18 NOTE — ASSESSMENT & PLAN NOTE
-Ear wax removed by Medical Assistant by soaking with peroxide for 5 minutes and then wax flushed out with an ear wash spray bottle

## 2024-11-18 NOTE — ASSESSMENT & PLAN NOTE
-stable  -Continue omeprazole 40 mg every other day  -Decrease caffeine, avoid spicy foods, avoid tomato based foods  -Eat small meals instead of large meals  -Wait 2-3 hours after eating before lying down

## 2024-11-18 NOTE — ASSESSMENT & PLAN NOTE
-Hemoglobin A1c of 6.1% shows diabetes is well controlled   -Continue diet control  -Limit carbohydrates to 45 grams with meals and 15 grams with snacks  -monitor blood sugars  -goal for blood sugar fasting or pre-meal  is   -goal for blood sugar 2 hours after a meal is less than 180  -goal for blood sugar at bedtime is less than 150  -Regular aerobic exercise

## 2025-01-07 DIAGNOSIS — I10 ESSENTIAL HYPERTENSION: Primary | ICD-10-CM

## 2025-01-07 RX ORDER — HYDROCHLOROTHIAZIDE 25 MG/1
25 TABLET ORAL EVERY MORNING
Qty: 30 TABLET | Refills: 1 | Status: SHIPPED | OUTPATIENT
Start: 2025-01-07 | End: 2025-01-07 | Stop reason: SDUPTHER

## 2025-01-07 RX ORDER — LOSARTAN POTASSIUM 50 MG/1
25 TABLET ORAL DAILY
Qty: 90 TABLET | Refills: 1 | Status: SHIPPED | OUTPATIENT
Start: 2025-01-07

## 2025-01-07 RX ORDER — LOSARTAN POTASSIUM 50 MG/1
25 TABLET ORAL DAILY
Qty: 90 TABLET | Refills: 1 | Status: SHIPPED | OUTPATIENT
Start: 2025-01-07 | End: 2025-01-07 | Stop reason: SDUPTHER

## 2025-01-07 RX ORDER — HYDROCHLOROTHIAZIDE 25 MG/1
25 TABLET ORAL EVERY MORNING
Qty: 90 TABLET | Refills: 1 | Status: SHIPPED | OUTPATIENT
Start: 2025-01-07

## 2025-02-07 ENCOUNTER — OFFICE VISIT (OUTPATIENT)
Dept: PRIMARY CARE CLINIC | Age: 80
End: 2025-02-07

## 2025-02-07 ENCOUNTER — HOSPITAL ENCOUNTER (OUTPATIENT)
Dept: GENERAL RADIOLOGY | Age: 80
Discharge: HOME | End: 2025-02-07
Payer: MEDICARE

## 2025-02-07 VITALS
DIASTOLIC BLOOD PRESSURE: 80 MMHG | TEMPERATURE: 98.5 F | HEIGHT: 70 IN | HEART RATE: 58 BPM | WEIGHT: 247 LBS | BODY MASS INDEX: 35.36 KG/M2 | OXYGEN SATURATION: 94 % | SYSTOLIC BLOOD PRESSURE: 120 MMHG

## 2025-02-07 DIAGNOSIS — R05.9 COUGH, UNSPECIFIED TYPE: Primary | ICD-10-CM

## 2025-02-07 DIAGNOSIS — I48.91 ATRIAL FIBRILLATION, UNSPECIFIED TYPE (HCC): ICD-10-CM

## 2025-02-07 DIAGNOSIS — R05.9 COUGH, UNSPECIFIED TYPE: ICD-10-CM

## 2025-02-07 DIAGNOSIS — R68.89 FLU-LIKE SYMPTOMS: ICD-10-CM

## 2025-02-07 DIAGNOSIS — Z11.52 ENCOUNTER FOR SCREENING FOR COVID-19: ICD-10-CM

## 2025-02-07 LAB
INFLUENZA A ANTIBODY: NORMAL
INFLUENZA B ANTIBODY: NORMAL
Lab: NORMAL
QC PASS/FAIL: NORMAL
S PYO AG THROAT QL: NORMAL
SARS-COV-2 RDRP RESP QL NAA+PROBE: NEGATIVE

## 2025-02-07 PROCEDURE — 71046 X-RAY EXAM CHEST 2 VIEWS: CPT

## 2025-02-07 RX ORDER — ALBUTEROL SULFATE 90 UG/1
2 INHALANT RESPIRATORY (INHALATION) 4 TIMES DAILY
Qty: 18 G | Refills: 0 | Status: SHIPPED | OUTPATIENT
Start: 2025-02-07

## 2025-02-07 RX ORDER — BENZONATATE 200 MG/1
200 CAPSULE ORAL 3 TIMES DAILY PRN
Qty: 30 CAPSULE | Refills: 0 | Status: SHIPPED | OUTPATIENT
Start: 2025-02-07 | End: 2025-02-17

## 2025-02-07 RX ORDER — METHYLPREDNISOLONE 4 MG/1
TABLET ORAL
Qty: 1 KIT | Refills: 0 | Status: SHIPPED | OUTPATIENT
Start: 2025-02-07

## 2025-02-07 SDOH — ECONOMIC STABILITY: FOOD INSECURITY: WITHIN THE PAST 12 MONTHS, THE FOOD YOU BOUGHT JUST DIDN'T LAST AND YOU DIDN'T HAVE MONEY TO GET MORE.: NEVER TRUE

## 2025-02-07 SDOH — ECONOMIC STABILITY: FOOD INSECURITY: WITHIN THE PAST 12 MONTHS, YOU WORRIED THAT YOUR FOOD WOULD RUN OUT BEFORE YOU GOT MONEY TO BUY MORE.: NEVER TRUE

## 2025-02-07 ASSESSMENT — PATIENT HEALTH QUESTIONNAIRE - PHQ9
SUM OF ALL RESPONSES TO PHQ QUESTIONS 1-9: 0
SUM OF ALL RESPONSES TO PHQ9 QUESTIONS 1 & 2: 0
2. FEELING DOWN, DEPRESSED OR HOPELESS: NOT AT ALL
SUM OF ALL RESPONSES TO PHQ QUESTIONS 1-9: 0
1. LITTLE INTEREST OR PLEASURE IN DOING THINGS: NOT AT ALL

## 2025-02-07 ASSESSMENT — ENCOUNTER SYMPTOMS
SORE THROAT: 1
SHORTNESS OF BREATH: 0
STRIDOR: 0
CHOKING: 0
COUGH: 1
GASTROINTESTINAL NEGATIVE: 1
EYES NEGATIVE: 1

## 2025-02-07 NOTE — PROGRESS NOTES
SUBJECTIVE:  Patient ID: Jimmy Hubbard is a 79 y.o. y.o. male     HPI   Upper Respiratory Infection: Patient complains of symptoms of a URI. Symptoms include congestion, cough, and sore throat. Onset of symptoms was 5 days ago, gradually worsening since that time. He also c/o achiness, congestion, nasal congestion, no  fever, and post nasal drip for the past 4 days .  He is drinking moderate amounts of fluids. Evaluation to date: was seen at urgent care given steroid and cough dose . Treatment to date: as before.  Patient was traveling which includes seen back in 30 years when he started not to feel well the pending urgent care was given just cough medicine his cough is getting worse    Past Medical History:   Diagnosis Date    Atrial fibrillation (HCC)     BPH (benign prostatic hyperplasia)     CAD (coronary artery disease)     Hyperlipidemia     Hypertension     Thyroid disease     Type 2 diabetes mellitus (HCC)       Past Surgical History:   Procedure Laterality Date    CARDIAC SURGERY  07/2022    CABG    GALLBLADDER SURGERY  2023    JOINT REPLACEMENT      KNEE ARTHROPLASTY       Family History   Family history unknown: Yes     Social History     Socioeconomic History    Marital status: Single     Spouse name: None    Number of children: None    Years of education: None    Highest education level: None   Tobacco Use    Smoking status: Never     Passive exposure: Never    Smokeless tobacco: Never   Vaping Use    Vaping status: Never Used   Substance and Sexual Activity    Alcohol use: Never    Drug use: Never    Sexual activity: Yes     Partners: Female     Social Determinants of Health     Financial Resource Strain: Low Risk  (12/14/2023)    Overall Financial Resource Strain (CARDIA)     Difficulty of Paying Living Expenses: Not hard at all   Food Insecurity: No Food Insecurity (2/7/2025)    Hunger Vital Sign     Worried About Running Out of Food in the Last Year: Never true     Ran Out of Food in the Last

## 2025-02-25 DIAGNOSIS — I10 ESSENTIAL HYPERTENSION: ICD-10-CM

## 2025-02-25 DIAGNOSIS — E03.9 ACQUIRED HYPOTHYROIDISM: Primary | ICD-10-CM

## 2025-02-25 RX ORDER — LEVOTHYROXINE SODIUM 50 UG/1
50 TABLET ORAL DAILY
Qty: 90 TABLET | Refills: 1 | Status: SHIPPED | OUTPATIENT
Start: 2025-02-25

## 2025-02-25 NOTE — TELEPHONE ENCOUNTER
Medication:   Requested Prescriptions     Pending Prescriptions Disp Refills    levothyroxine (SYNTHROID) 50 MCG tablet 90 tablet 1     Sig: Take 1 tablet by mouth Daily     Last Filled:  05/07/2025    Last appt: 2/7/2025   Next appt: 5/20/2025    Last OARRS:        No data to display

## 2025-02-26 LAB
ANION GAP SERPL CALCULATED.3IONS-SCNC: 11 MMOL/L (ref 3–16)
BUN SERPL-MCNC: 27 MG/DL (ref 7–20)
CALCIUM SERPL-MCNC: 9.9 MG/DL (ref 8.3–10.6)
CHLORIDE SERPL-SCNC: 100 MMOL/L (ref 99–110)
CO2 SERPL-SCNC: 25 MMOL/L (ref 21–32)
CREAT SERPL-MCNC: 1.7 MG/DL (ref 0.8–1.3)
GFR SERPLBLD CREATININE-BSD FMLA CKD-EPI: 41 ML/MIN/{1.73_M2}
GLUCOSE SERPL-MCNC: 171 MG/DL (ref 70–99)
POTASSIUM SERPL-SCNC: 3.8 MMOL/L (ref 3.5–5.1)
SODIUM SERPL-SCNC: 136 MMOL/L (ref 136–145)

## 2025-04-30 NOTE — TELEPHONE ENCOUNTER
Patient last seen on 7/30/24. Advised to follow up 6 months. Next appointment none.       Patient needs an appt. Will call at an appropriate time to schedule     RKG OOT     Lab Results   Component Value Date    CHOL 148 11/04/2024    TRIG 115 11/04/2024    HDL 27 (L) 11/04/2024    LDL 98 11/04/2024    VLDL 23 11/04/2024

## 2025-05-01 RX ORDER — FENOFIBRATE 160 MG/1
160 TABLET ORAL DAILY
Qty: 90 TABLET | Refills: 0 | Status: SHIPPED | OUTPATIENT
Start: 2025-05-01

## 2025-05-13 ENCOUNTER — PATIENT MESSAGE (OUTPATIENT)
Dept: PRIMARY CARE CLINIC | Age: 80
End: 2025-05-13

## 2025-05-13 SDOH — HEALTH STABILITY: PHYSICAL HEALTH: ON AVERAGE, HOW MANY DAYS PER WEEK DO YOU ENGAGE IN MODERATE TO STRENUOUS EXERCISE (LIKE A BRISK WALK)?: 1 DAY

## 2025-05-13 SDOH — HEALTH STABILITY: PHYSICAL HEALTH: ON AVERAGE, HOW MANY MINUTES DO YOU ENGAGE IN EXERCISE AT THIS LEVEL?: 20 MIN

## 2025-05-13 ASSESSMENT — PATIENT HEALTH QUESTIONNAIRE - PHQ9
SUM OF ALL RESPONSES TO PHQ QUESTIONS 1-9: 0
1. LITTLE INTEREST OR PLEASURE IN DOING THINGS: NOT AT ALL
2. FEELING DOWN, DEPRESSED OR HOPELESS: NOT AT ALL
SUM OF ALL RESPONSES TO PHQ QUESTIONS 1-9: 0

## 2025-05-14 DIAGNOSIS — E11.9 TYPE 2 DIABETES MELLITUS WITHOUT COMPLICATION, WITHOUT LONG-TERM CURRENT USE OF INSULIN (HCC): ICD-10-CM

## 2025-05-14 DIAGNOSIS — E78.2 MIXED HYPERLIPIDEMIA: ICD-10-CM

## 2025-05-14 DIAGNOSIS — I10 ESSENTIAL HYPERTENSION: ICD-10-CM

## 2025-05-14 DIAGNOSIS — E03.9 ACQUIRED HYPOTHYROIDISM: ICD-10-CM

## 2025-05-14 DIAGNOSIS — N18.31 STAGE 3A CHRONIC KIDNEY DISEASE (HCC): ICD-10-CM

## 2025-05-14 LAB
ALBUMIN SERPL-MCNC: 4.2 G/DL (ref 3.4–5)
ALBUMIN/GLOB SERPL: 1.9 {RATIO} (ref 1.1–2.2)
ALP SERPL-CCNC: 35 U/L (ref 40–129)
ALT SERPL-CCNC: 27 U/L (ref 10–40)
ANION GAP SERPL CALCULATED.3IONS-SCNC: 12 MMOL/L (ref 3–16)
AST SERPL-CCNC: 24 U/L (ref 15–37)
BILIRUB SERPL-MCNC: 0.6 MG/DL (ref 0–1)
BUN SERPL-MCNC: 27 MG/DL (ref 7–20)
CALCIUM SERPL-MCNC: 9.9 MG/DL (ref 8.3–10.6)
CHLORIDE SERPL-SCNC: 99 MMOL/L (ref 99–110)
CHOLEST SERPL-MCNC: 193 MG/DL (ref 0–199)
CO2 SERPL-SCNC: 28 MMOL/L (ref 21–32)
CREAT SERPL-MCNC: 1.5 MG/DL (ref 0.8–1.3)
GFR SERPLBLD CREATININE-BSD FMLA CKD-EPI: 47 ML/MIN/{1.73_M2}
GLUCOSE SERPL-MCNC: 151 MG/DL (ref 70–99)
HDLC SERPL-MCNC: 23 MG/DL (ref 40–60)
LDLC SERPL CALC-MCNC: 119 MG/DL
POTASSIUM SERPL-SCNC: 4.2 MMOL/L (ref 3.5–5.1)
PROT SERPL-MCNC: 6.4 G/DL (ref 6.4–8.2)
SODIUM SERPL-SCNC: 139 MMOL/L (ref 136–145)
TRIGL SERPL-MCNC: 254 MG/DL (ref 0–150)
TSH SERPL DL<=0.005 MIU/L-ACNC: 6.34 UIU/ML (ref 0.27–4.2)
VLDLC SERPL CALC-MCNC: 51 MG/DL

## 2025-05-15 LAB
EST. AVERAGE GLUCOSE BLD GHB EST-MCNC: 174.3 MG/DL
HBA1C MFR BLD: 7.7 %

## 2025-05-15 NOTE — TELEPHONE ENCOUNTER
Last Office Visit: 7/30/2024 Provider: MARIAH  **Is provider OOT? No    Next Office Visit: 6/5/25 Provider: MARIAH    Lab orders needed? no   Encounter provider correct? Yes If not, change provider  Script changes since last refill? no

## 2025-05-16 RX ORDER — FENOFIBRATE 160 MG/1
160 TABLET ORAL DAILY
Qty: 30 TABLET | Refills: 1 | Status: SHIPPED | OUTPATIENT
Start: 2025-05-16

## 2025-05-16 NOTE — TELEPHONE ENCOUNTER
Lab Results   Component Value Date    CHOL 193 05/14/2025    TRIG 254 (H) 05/14/2025    HDL 23 (L) 05/14/2025     (H) 05/14/2025    VLDL 51 05/14/2025        [facility] Hematology and Oncology Progress Note    Patient: Darlene Hudson  MRN: [id number]  Date of Service: May 31, 2023        Assessment and Plan:    Cancer Staging   Adenocarcinoma of descending colon (H)  Staging form: Colon and Rectum, AJCC 8th Edition  - Pathologic stage from 2/7/2023: Stage I (pT2, pN0, cM0) - Signed by Arnaud Valdez MD on 2/7/2023    1.  Adenocarcinoma of the descending colon:  Has recently had surveillance CT imaging.  There is no evidence of recurrent disease noted.  Clinically she is doing well.  She has met with cancer genetics and will be having testing done soon.  We will see her back in 6 months with repeat imaging.  She will be due for colonoscopy in January of 2024.      2.  Left external iliac lymph node seen on imaging: Stable on current CT when compared to January 2023.  We will continue to monitor radiographically.  Most likely benign. There was moderate follicular lymphoid hyperplasia noted in some of her mesenteric lymph nodes on surgery.     3.  Anxiety: She is having more anxiety related to her diagnosis and treatment.  A referral was made to oncology psychotherapy in this regard.    I spent 35 minutes in the care of this patient today, which included time necessary for preparation for the visit, face to face time with the patient, communication of recommendations to the care team, and documentation time.    ECOG Performance  0    Diagnosis:    1.  Adenocarcinoma of the descending colon: Diagnosed January 2023. Surgical pathology revealed an invasive moderately differentiated adenocarcinoma. 47 x 43.14 mm tumor invading into, but not through, the muscularis propria.  Margins negative.  48 nodes taken, all negative.   No perforation, lymphovascular invasion, or perineural invasion noted on pathology. pMMR.     Treatment:    Transverse and descending segmental colectomy January 6, 2023.   No adjuvant therapy was given.     Interim History:    Karoline returns for  follow-up visit.  She is last seen about 4 months ago.  In the interim she has been doing okay.  No pain.  She is having more worry and anxiety over her health.  She is noted more exhaustion and being more tired lately.  She is back working full-time at a physically demanding job.  No acute complaints today.    Review of Systems:    As above in the history.     Review of Systems otherwise Negative for:  General: chills, fever or night sweats  Psychological: depression  Ophthalmic: blurry vision, double vision or loss of vision, vision change  ENT: epistaxis, oral lesions, hearing changes  Hematological and Lymphatic: bleeding, bruising, jaundice, swollen lymph nodes  Endocrine: hot flashes, unexpected weight changes  Respiratory: cough, hemoptysis, orthopnea or shortness of breath/ROPER  Cardiovascular: chest pain, edema, palpitations or PND  Gastrointestinal: abdominal pain, blood in stools, change in bowel habits, constipation, diarrhea or nausea/vomiting  Genito-Urinary: change in urinary stream, incontinence, frequency/urgency  Musculoskeletal: joint pain, stiffness, swelling, muscle pain  Neurological: dizziness, headaches, numbness/tingling  Dermatological: lumps and rash    Past History:    Past Medical History:   Diagnosis Date     Abdominal pain 06/29/2015     Abnormal cervical Papanicolaou smear 11/09/2014    Overview:  ACUS/HPV positive     Abnormal cytology finding 11/09/2014    Overview:  ACUS/HPV positive     Acute pericardial effusion 02/06/2017     Agoraphobia with panic attacks      Anxiety      Arthralgia of both lower legs 05/29/2020    Bilateral achy knee pain that is chronic in nature going on for years. Most likely osteoarthritis in knees related to obesity. Previously injected in both knees with good relief. Injected last on 5/29/20     Arthritis     of back     Asthma in adult, moderate persistent, uncomplicated      Atopic rhinitis 01/27/2017     Chronic infectious pericarditis 02/21/2019      "Chronic low back pain 01/27/2017     Chronic pain      Chronic sinusitis      Cocaine abuse (H)      Colonic mass 12/28/2022    Added automatically from request for surgery [id number]     Controlled substance agreement signed 06/30/2015    Overview:  Patient has chronic pain and is seen at [facility] for this.  Has controlled substance agreement with them.  On Vicodin, Valium, Klonopin prescribed only from there.        Coronary artery disease      Cough 02/09/2020     Family history of colon cancer 10/24/2020     Hx of seasonal allergies      Infection due to 2019 novel coronavirus 09/20/2022     Infectious pericarditis      Lipoma      Low back pain 07/13/2015     Major depressive disorder, recurrent episode, moderate (H) 09/05/2006     Menorrhagia with irregular cycle 07/15/2022    Added automatically from request for surgery [id number]     Moderate persistent asthma without complication 09/29/2020     Nondependent alcohol abuse, episodic drinking behavior 10/03/2012     Noninflammatory disorder of vagina 02/20/2015     Other chronic pain      Other long term (current) drug therapy 01/28/2013    Overview:  Vicodin and cyclobenzaprine monthly     Panic disorder with agoraphobia 09/05/2006     Pap smear for cervical cancer screening 10/31/2016    03/29/2010  Normal cytology, HPV ot done, repeat in 3 years.     Pericarditis 2017     Physiologic disturbance of temperature regulation 10/24/2020     PONV (postoperative nausea and vomiting)      Right ankle swelling 06/07/2022    Added automatically from request for surgery [id number]     Tobacco abuse      Tobacco use disorder 07/13/2015     Physical Exam:    BP (!) 133/104 (BP Location: Left arm, Patient Position: Sitting, Cuff Size: Adult Large)   Pulse 88   Temp 98.2  F (36.8  C) (Tympanic)   Resp 18   Ht 1.753 m (5' 9\")   Wt 111.1 kg (245 lb)   SpO2 100%   BMI 36.18 kg/m      General: patient appears stated age of 51 year old. Nontoxic and in no distress. "   HEENT: Head: atraumatic, normocephalic. Sclerae anicteric.  Chest:  Normal respiratory effort  Cardiac:  No edema.   Abdomen: abdomen is soft, non-distended  Extremities: normal tone and muscle bulk.  Skin: no lesions or rash on visible skin. Warm and dry.   CNS: alert and oriented. Grossly non-focal.   Psychiatric: normal mood and affect.     Lab Results:    No results found for this or any previous visit (from the past 168 hour(s)).     Imaging:    CT Chest/Abdomen/Pelvis w Contrast    Result Date: 5/25/2023  EXAM: CT CHEST/ABDOMEN/PELVIS W CONTRAST LOCATION: [facility] DATE/TIME: 5/25/2023 4:35 PM CDT INDICATION: Colon cancer followup. Inguinal lymphadenopathy. COMPARISON: 01/31/2023 CT abdomen and pelvis. TECHNIQUE: CT scan of the chest, abdomen, and pelvis was performed following injection of IV contrast. Multiplanar reformats were obtained. Dose reduction techniques were used. CONTRAST: Isovue 370 90 ml FINDINGS: LUNGS AND PLEURA: Emphysematous change. Bibasilar atelectasis. MEDIASTINUM/AXILLAE: 1 cm stable enlarged node in the right pericardiac region. CORONARY ARTERY CALCIFICATION: None. HEPATOBILIARY: Normal. PANCREAS: Normal. SPLEEN: Normal. ADRENAL GLANDS: Normal. KIDNEYS/BLADDER: Simple cyst left kidney. No follow-up is needed. BOWEL: Postsurgical changes in the left hemicolon. No fluid collection or evidence of bowel obstruction. No appendicitis. LYMPH NODES: Stable enlarged left external iliac node measuring 1.1 cm in short axis dimension. VASCULATURE: Unremarkable. PELVIC ORGANS: Normal. MUSCULOSKELETAL: Normal.     IMPRESSION: 1.  Postsurgical changes in the left hemicolon. No obstruction or fluid collection. 2.  Stable enlarged left external iliac lymph node measuring 1.1 cm in short axis dimension. There is a also stable enlarged node in the right anterior cardiophrenic angle measuring 1 cm in short axis dimension.      Signed by: Arnaud Valdez MD

## 2025-05-19 ENCOUNTER — APPOINTMENT (OUTPATIENT)
Dept: CT IMAGING | Age: 80
DRG: 443 | End: 2025-05-19
Payer: MEDICARE

## 2025-05-19 ENCOUNTER — APPOINTMENT (OUTPATIENT)
Dept: GENERAL RADIOLOGY | Age: 80
DRG: 443 | End: 2025-05-19
Payer: MEDICARE

## 2025-05-19 ENCOUNTER — HOSPITAL ENCOUNTER (INPATIENT)
Age: 80
LOS: 1 days | Discharge: HOME | DRG: 443 | End: 2025-05-21
Attending: STUDENT IN AN ORGANIZED HEALTH CARE EDUCATION/TRAINING PROGRAM | Admitting: INTERNAL MEDICINE
Payer: MEDICARE

## 2025-05-19 DIAGNOSIS — R10.13 ABDOMINAL PAIN, EPIGASTRIC: Primary | ICD-10-CM

## 2025-05-19 DIAGNOSIS — R93.5 ABNORMAL CT OF THE ABDOMEN: ICD-10-CM

## 2025-05-19 PROBLEM — R10.9 ABDOMINAL PAIN: Status: ACTIVE | Noted: 2025-05-19

## 2025-05-19 LAB
ALBUMIN SERPL-MCNC: 4.2 G/DL (ref 3.4–5)
ALBUMIN/GLOB SERPL: 1.8 {RATIO} (ref 1.1–2.2)
ALP SERPL-CCNC: 35 U/L (ref 40–129)
ALT SERPL-CCNC: 24 U/L (ref 10–40)
ANION GAP SERPL CALCULATED.3IONS-SCNC: 10 MMOL/L (ref 3–16)
APTT BLD: 34.2 SEC (ref 22.1–36.4)
AST SERPL-CCNC: 18 U/L (ref 15–37)
BASOPHILS # BLD: 0.1 K/UL (ref 0–0.2)
BASOPHILS NFR BLD: 0.6 %
BILIRUB SERPL-MCNC: 1.1 MG/DL (ref 0–1)
BUN SERPL-MCNC: 28 MG/DL (ref 7–20)
CALCIUM SERPL-MCNC: 9.5 MG/DL (ref 8.3–10.6)
CHLORIDE SERPL-SCNC: 97 MMOL/L (ref 99–110)
CO2 SERPL-SCNC: 25 MMOL/L (ref 21–32)
CREAT SERPL-MCNC: 1.5 MG/DL (ref 0.8–1.3)
DEPRECATED RDW RBC AUTO: 14.4 % (ref 12.4–15.4)
EKG ATRIAL RATE: 73 BPM
EKG DIAGNOSIS: NORMAL
EKG P AXIS: 54 DEGREES
EKG P-R INTERVAL: 160 MS
EKG Q-T INTERVAL: 404 MS
EKG QRS DURATION: 92 MS
EKG QTC CALCULATION (BAZETT): 445 MS
EKG R AXIS: -18 DEGREES
EKG T AXIS: -4 DEGREES
EKG VENTRICULAR RATE: 73 BPM
EOSINOPHIL # BLD: 0.1 K/UL (ref 0–0.6)
EOSINOPHIL NFR BLD: 1.1 %
GFR SERPLBLD CREATININE-BSD FMLA CKD-EPI: 47 ML/MIN/{1.73_M2}
GLUCOSE SERPL-MCNC: 152 MG/DL (ref 70–99)
HCT VFR BLD AUTO: 43.2 % (ref 40.5–52.5)
HGB BLD-MCNC: 14.8 G/DL (ref 13.5–17.5)
INR PPP: 1.35 (ref 0.85–1.15)
LIPASE SERPL-CCNC: 30 U/L (ref 13–60)
LYMPHOCYTES # BLD: 1.4 K/UL (ref 1–5.1)
LYMPHOCYTES NFR BLD: 15.3 %
MCH RBC QN AUTO: 29.6 PG (ref 26–34)
MCHC RBC AUTO-ENTMCNC: 34.3 G/DL (ref 31–36)
MCV RBC AUTO: 86.3 FL (ref 80–100)
MONOCYTES # BLD: 0.8 K/UL (ref 0–1.3)
MONOCYTES NFR BLD: 8.7 %
NEUTROPHILS # BLD: 6.7 K/UL (ref 1.7–7.7)
NEUTROPHILS NFR BLD: 74.3 %
PLATELET # BLD AUTO: 252 K/UL (ref 135–450)
PMV BLD AUTO: 7.6 FL (ref 5–10.5)
POTASSIUM SERPL-SCNC: 3.6 MMOL/L (ref 3.5–5.1)
PROT SERPL-MCNC: 6.5 G/DL (ref 6.4–8.2)
PROTHROMBIN TIME: 16.8 SEC (ref 11.9–14.9)
RBC # BLD AUTO: 5.01 M/UL (ref 4.2–5.9)
SODIUM SERPL-SCNC: 132 MMOL/L (ref 136–145)
TROPONIN, HIGH SENSITIVITY: 24 NG/L (ref 0–22)
TROPONIN, HIGH SENSITIVITY: 25 NG/L (ref 0–22)
WBC # BLD AUTO: 9 K/UL (ref 4–11)

## 2025-05-19 PROCEDURE — 71046 X-RAY EXAM CHEST 2 VIEWS: CPT

## 2025-05-19 PROCEDURE — 6370000000 HC RX 637 (ALT 250 FOR IP): Performed by: INTERNAL MEDICINE

## 2025-05-19 PROCEDURE — 2500000003 HC RX 250 WO HCPCS: Performed by: INTERNAL MEDICINE

## 2025-05-19 PROCEDURE — 6360000002 HC RX W HCPCS: Performed by: PHYSICIAN ASSISTANT

## 2025-05-19 PROCEDURE — 85730 THROMBOPLASTIN TIME PARTIAL: CPT

## 2025-05-19 PROCEDURE — 99285 EMERGENCY DEPT VISIT HI MDM: CPT

## 2025-05-19 PROCEDURE — 2580000003 HC RX 258: Performed by: PHYSICIAN ASSISTANT

## 2025-05-19 PROCEDURE — 83690 ASSAY OF LIPASE: CPT

## 2025-05-19 PROCEDURE — 6360000004 HC RX CONTRAST MEDICATION: Performed by: PHYSICIAN ASSISTANT

## 2025-05-19 PROCEDURE — 85610 PROTHROMBIN TIME: CPT

## 2025-05-19 PROCEDURE — 1200000000 HC SEMI PRIVATE

## 2025-05-19 PROCEDURE — 80053 COMPREHEN METABOLIC PANEL: CPT

## 2025-05-19 PROCEDURE — 85025 COMPLETE CBC W/AUTO DIFF WBC: CPT

## 2025-05-19 PROCEDURE — 36415 COLL VENOUS BLD VENIPUNCTURE: CPT

## 2025-05-19 PROCEDURE — 93005 ELECTROCARDIOGRAM TRACING: CPT | Performed by: PHYSICIAN ASSISTANT

## 2025-05-19 PROCEDURE — 96374 THER/PROPH/DIAG INJ IV PUSH: CPT

## 2025-05-19 PROCEDURE — 74177 CT ABD & PELVIS W/CONTRAST: CPT

## 2025-05-19 PROCEDURE — 84484 ASSAY OF TROPONIN QUANT: CPT

## 2025-05-19 RX ORDER — ACETAMINOPHEN 650 MG/1
650 SUPPOSITORY RECTAL EVERY 6 HOURS PRN
Status: DISCONTINUED | OUTPATIENT
Start: 2025-05-19 | End: 2025-05-21 | Stop reason: HOSPADM

## 2025-05-19 RX ORDER — 0.9 % SODIUM CHLORIDE 0.9 %
1000 INTRAVENOUS SOLUTION INTRAVENOUS ONCE
Status: COMPLETED | OUTPATIENT
Start: 2025-05-19 | End: 2025-05-19

## 2025-05-19 RX ORDER — ALBUTEROL SULFATE 90 UG/1
2 INHALANT RESPIRATORY (INHALATION) 4 TIMES DAILY
Status: DISCONTINUED | OUTPATIENT
Start: 2025-05-19 | End: 2025-05-19

## 2025-05-19 RX ORDER — SODIUM CHLORIDE 9 MG/ML
INJECTION, SOLUTION INTRAVENOUS PRN
Status: DISCONTINUED | OUTPATIENT
Start: 2025-05-19 | End: 2025-05-21 | Stop reason: HOSPADM

## 2025-05-19 RX ORDER — IOPAMIDOL 755 MG/ML
75 INJECTION, SOLUTION INTRAVASCULAR
Status: COMPLETED | OUTPATIENT
Start: 2025-05-19 | End: 2025-05-19

## 2025-05-19 RX ORDER — SODIUM CHLORIDE 0.9 % (FLUSH) 0.9 %
5-40 SYRINGE (ML) INJECTION PRN
Status: DISCONTINUED | OUTPATIENT
Start: 2025-05-19 | End: 2025-05-21 | Stop reason: HOSPADM

## 2025-05-19 RX ORDER — LEVOTHYROXINE SODIUM 50 UG/1
50 TABLET ORAL DAILY
Status: DISCONTINUED | OUTPATIENT
Start: 2025-05-20 | End: 2025-05-21 | Stop reason: HOSPADM

## 2025-05-19 RX ORDER — SODIUM CHLORIDE 0.9 % (FLUSH) 0.9 %
5-40 SYRINGE (ML) INJECTION EVERY 12 HOURS SCHEDULED
Status: DISCONTINUED | OUTPATIENT
Start: 2025-05-19 | End: 2025-05-21 | Stop reason: HOSPADM

## 2025-05-19 RX ORDER — KETOROLAC TROMETHAMINE 30 MG/ML
15 INJECTION, SOLUTION INTRAMUSCULAR; INTRAVENOUS ONCE
Status: COMPLETED | OUTPATIENT
Start: 2025-05-19 | End: 2025-05-19

## 2025-05-19 RX ORDER — LOSARTAN POTASSIUM 25 MG/1
25 TABLET ORAL DAILY
Status: DISCONTINUED | OUTPATIENT
Start: 2025-05-20 | End: 2025-05-21 | Stop reason: HOSPADM

## 2025-05-19 RX ORDER — PANTOPRAZOLE SODIUM 40 MG/1
40 TABLET, DELAYED RELEASE ORAL
Status: DISCONTINUED | OUTPATIENT
Start: 2025-05-20 | End: 2025-05-21 | Stop reason: HOSPADM

## 2025-05-19 RX ORDER — ONDANSETRON 4 MG/1
4 TABLET, ORALLY DISINTEGRATING ORAL EVERY 8 HOURS PRN
Status: DISCONTINUED | OUTPATIENT
Start: 2025-05-19 | End: 2025-05-21 | Stop reason: HOSPADM

## 2025-05-19 RX ORDER — FINASTERIDE 5 MG/1
5 TABLET, FILM COATED ORAL DAILY
Status: DISCONTINUED | OUTPATIENT
Start: 2025-05-19 | End: 2025-05-20

## 2025-05-19 RX ORDER — ATORVASTATIN CALCIUM 80 MG/1
80 TABLET, FILM COATED ORAL NIGHTLY
Status: DISCONTINUED | OUTPATIENT
Start: 2025-05-19 | End: 2025-05-21 | Stop reason: HOSPADM

## 2025-05-19 RX ORDER — ONDANSETRON 2 MG/ML
4 INJECTION INTRAMUSCULAR; INTRAVENOUS EVERY 6 HOURS PRN
Status: DISCONTINUED | OUTPATIENT
Start: 2025-05-19 | End: 2025-05-21 | Stop reason: HOSPADM

## 2025-05-19 RX ORDER — AMLODIPINE BESYLATE 5 MG/1
10 TABLET ORAL DAILY
Status: DISCONTINUED | OUTPATIENT
Start: 2025-05-20 | End: 2025-05-21 | Stop reason: HOSPADM

## 2025-05-19 RX ORDER — ALBUTEROL SULFATE 90 UG/1
2 INHALANT RESPIRATORY (INHALATION) EVERY 4 HOURS PRN
Status: DISCONTINUED | OUTPATIENT
Start: 2025-05-19 | End: 2025-05-21 | Stop reason: HOSPADM

## 2025-05-19 RX ORDER — POLYETHYLENE GLYCOL 3350 17 G/17G
17 POWDER, FOR SOLUTION ORAL DAILY PRN
Status: DISCONTINUED | OUTPATIENT
Start: 2025-05-19 | End: 2025-05-21 | Stop reason: HOSPADM

## 2025-05-19 RX ORDER — ACETAMINOPHEN 325 MG/1
650 TABLET ORAL EVERY 6 HOURS PRN
Status: DISCONTINUED | OUTPATIENT
Start: 2025-05-19 | End: 2025-05-21 | Stop reason: HOSPADM

## 2025-05-19 RX ORDER — HYDROCHLOROTHIAZIDE 25 MG/1
25 TABLET ORAL EVERY MORNING
Status: DISCONTINUED | OUTPATIENT
Start: 2025-05-20 | End: 2025-05-21 | Stop reason: HOSPADM

## 2025-05-19 RX ORDER — TAMSULOSIN HYDROCHLORIDE 0.4 MG/1
0.4 CAPSULE ORAL DAILY
Status: DISCONTINUED | OUTPATIENT
Start: 2025-05-19 | End: 2025-05-20

## 2025-05-19 RX ADMIN — SODIUM CHLORIDE 1000 ML: 0.9 INJECTION, SOLUTION INTRAVENOUS at 13:05

## 2025-05-19 RX ADMIN — ATORVASTATIN CALCIUM 80 MG: 80 TABLET, FILM COATED ORAL at 19:55

## 2025-05-19 RX ADMIN — Medication 10 ML: at 19:57

## 2025-05-19 RX ADMIN — KETOROLAC TROMETHAMINE 15 MG: 30 INJECTION, SOLUTION INTRAMUSCULAR at 13:05

## 2025-05-19 RX ADMIN — IOPAMIDOL 75 ML: 755 INJECTION, SOLUTION INTRAVENOUS at 14:06

## 2025-05-19 ASSESSMENT — PAIN DESCRIPTION - ORIENTATION: ORIENTATION: MID

## 2025-05-19 ASSESSMENT — PAIN SCALES - GENERAL
PAINLEVEL_OUTOF10: 5
PAINLEVEL_OUTOF10: 4
PAINLEVEL_OUTOF10: 0

## 2025-05-19 ASSESSMENT — PAIN DESCRIPTION - LOCATION: LOCATION: ABDOMEN

## 2025-05-19 ASSESSMENT — PAIN DESCRIPTION - DESCRIPTORS: DESCRIPTORS: ACHING

## 2025-05-19 ASSESSMENT — PAIN - FUNCTIONAL ASSESSMENT: PAIN_FUNCTIONAL_ASSESSMENT: 0-10

## 2025-05-19 NOTE — ED PROVIDER NOTES
[facility] EMERGENCY DEPARTMENT  EMERGENCY DEPARTMENT ENCOUNTER        Patient Name: Jimmy Hubbard  MRN: [id number]  Birthdate 1945  Date of evaluation: 5/19/2025  Provider: Ilda Higgins MD  PCP: Piper Veliz MD  Note Started: 5:13 PM EDT 5/19/25    I independently examined and evaluated Jimmy Hubbard. I personally saw the patient and performed a substantive portion of the visit including all aspects of the medical decision making.  I made/approved the management plan and take responsibility for the patient management.  I am the primary physician of record.    CHIEF COMPLAINT  Abdominal pain       HISTORY OF PRESENT ILLNESS  History from : Patient    Limitations to history : None    In brief, Jimmy Hubbard is a 79 y.o. male  has a past medical history of Atrial fibrillation (HCC), BPH (benign prostatic hyperplasia), CAD (coronary artery disease), Hyperlipidemia, Hypertension, Thyroid disease, and Type 2 diabetes mellitus (HCC)., who presents to the ED complaining of upper abdominal pain.  Started last night after eating.  Epigastric radiating to the right side.  Constant.  Patient denies nausea, vomiting, diarrhea, constipation, dysuria.      REVIEW OF SYSTEMS  All systems reviewed, pertinent positives per HPI otherwise noted to be negative.    Focused exam revealed   PHYSICAL EXAM  ED Triage Vitals [05/19/25 1219]   BP Systolic BP Percentile Diastolic BP Percentile Temp Temp Source Pulse Respirations SpO2   127/85 -- -- 97.3 °F (36.3 °C) Oral 66 16 97 %      Height Weight - Scale         -- 113.4 kg (250 lb)           GENERAL APPEARANCE: Awake and alert. Cooperative. no distress.  HENT: Normocephalic. Atraumatic. Mucous membranes are moist  NECK: Supple.  Full range of motion of the neck without stiffness or pain.  EYES: PERRL. EOM's grossly intact.  HEART/CHEST: RRR. No murmurs.  Chest wall is not tender to palpation.  LUNGS: Respirations unlabored. CTAB. Good air exchange.  dysfunction.  No evidence of acute pancreatitis.  CT showing a mass, general surgery consulted who recommended admission for monitoring.  At this time, do feel the patient requires admission for further work-up and management.  Patient agrees to admission. Discussed the patient with hospital team, Dr Rowley, patient to be admitted to [facility].      Vitals:    Vitals:    05/19/25 1219   BP: 127/85   Pulse: 66   Resp: 16   Temp: 97.3 °F (36.3 °C)   TempSrc: Oral   SpO2: 97%   Weight: 113.4 kg (250 lb)       CRITICAL CARE TIME     I personally spent a total of 0 minutes of critical care time in obtaining history, performing a physical exam, bedside monitoring of interventions, collecting and interpreting tests and discussion with consultants but excluding time spent performing procedures, treating other patients and teaching time.                   FINAL IMPRESSION      1. Abdominal pain, epigastric    2. Abnormal CT of the abdomen          DISPOSITION/PLAN   Disposition: DISPOSITION Admitted 05/19/2025 06:18:05 PM   DISPOSITION CONDITION Stable       DISCLAIMER: This chart was created using Dragon dictation software.  Efforts were made by me to ensure accuracy, however some errors may be present due to limitations of this technology and occasionally words are not transcribed correctly.    MD Ronaldo Evans Erica J, MD  05/19/25 2331

## 2025-05-19 NOTE — ED PROVIDER NOTES
I did not personally evaluate this patient but I was asked to review the EKG.     EKG  The Ekg interpreted by myself in the emergency department in the absence of a cardiologist.  normal sinus rhythm with a rate of 73  Axis is   Normal  QTc is  within an acceptable range  Intervals and Durations are unremarkable.      No specific ST-T wave changes appreciated.  No evidence of acute ischemia.   No significant change from prior EKG dated 1/23/24     Christo Mejia MD  05/19/25 3049

## 2025-05-19 NOTE — PROGRESS NOTES
Pt arrived to room 335 via wheelchair from the ED. Pt is alert and oriented. VSS. RA. Pt denies pain and discomfort at this time. Admission questions completed and documented. Call light within reach. Bed side table within reach. Wheels locked. Bed in lowest position. Pt instructed to call out for assistance. Pt expressesed understanding & calls out appropritately. All care per orders. Electronically signed by Abril Brown RN on 5/19/2025 at 8:01 PM

## 2025-05-19 NOTE — ED NOTES
@1648 called General Surgery per  Bob Palma PA-C   RE: Cystic mass in the abdomen  @1701 Dr. Thurman called back and spoke with  Bob Palma PA-C

## 2025-05-19 NOTE — PROGRESS NOTES
05/19/25 1952   RT Protocol   History Pulmonary Disease 0   Respiratory pattern 0   Breath sounds 0   Cough 0   Indications for Bronchodilator Therapy On home bronchodilators   Bronchodilator Assessment Score 0

## 2025-05-19 NOTE — ED NOTES
Jimmy Hubbard is a 79 y.o. male admitted for  Principal Problem:    Abdominal pain  Resolved Problems:    * No resolved hospital problems. *  .   Patient Home via family with   Chief Complaint   Patient presents with    Abdominal Pain     Pt reports since having dinner last night, has been having bilateral upper quadrant abdominal pain wrapping to right side. Pt reports pain has been constant. Denies diarrhea, denies emesis.    .  Patient is alert and Person, Place, Time, and Situation  Patient's baseline mobility: Baseline Mobility: Independent   Code Status: No Order   Cardiac Rhythm:       Is patient on baseline Oxygen: no how many Liters:   Abnormal Assessment Findings:     Isolation: None      NIH Score:    C-SSRS: Risk of Suicide: No Risk  Bedside swallow:        Active LDA's:   Peripheral IV 05/19/25 Right Antecubital (Active)     Patient admitted with a carey: no If the carey is chronic was it exchanged:No  Reason for carey:   Patient admitted with Central Line:  . PICC line placement confirmed: YES OR NO:[id number]}   Reason for Central line:   Was central line Inserted from an outside facility:        Family/Caregiver Present yes Any Concerns: no   Restraints no  Sitter no         Vitals: MEWS Score: 1    Vitals:    05/19/25 1219   BP: 127/85   Pulse: 66   Resp: 16   Temp: 97.3 °F (36.3 °C)   TempSrc: Oral   SpO2: 97%   Weight: 113.4 kg (250 lb)       Last documented pain score (0-10 scale) Pain Level: 4  Pain medication administered Yes- see MAR.    Pertinent or High Risk Medications/Drips: No.    Pending Blood Product Administration: no    Abnormal labs:   Abnormal Labs Reviewed   COMPREHENSIVE METABOLIC PANEL W/ REFLEX TO MG FOR LOW K - Abnormal; Notable for the following components:       Result Value    Sodium 132 (*)     Chloride 97 (*)     Glucose 152 (*)     BUN 28 (*)     Creatinine 1.5 (*)     Est, Glom Filt Rate 47 (*)     Total Bilirubin 1.1 (*)     Alkaline Phosphatase 35 (*)     All other  components within normal limits   TROPONIN - Abnormal; Notable for the following components:    Troponin, High Sensitivity 24 (*)     All other components within normal limits   TROPONIN - Abnormal; Notable for the following components:    Troponin, High Sensitivity 25 (*)     All other components within normal limits   PROTIME-INR - Abnormal; Notable for the following components:    Protime 16.8 (*)     INR 1.35 (*)     All other components within normal limits     Critical values: no  Intervention for critical value(s):     Abnormal Imaging: yes,  CT scan            You may also review the ED PT Care Timeline found under the Summary Tab, ED Encounter Summary, Timeline Reports, ED Patient Care Timeline.     Recommendation    Pending orders/Uncompleted orders to hand off:      Additional Comments:   If any further questions, please call Sending RN at ED Main line

## 2025-05-20 ENCOUNTER — APPOINTMENT (OUTPATIENT)
Dept: MRI IMAGING | Age: 80
DRG: 443 | End: 2025-05-20
Payer: MEDICARE

## 2025-05-20 PROBLEM — R10.13 ABDOMINAL PAIN, EPIGASTRIC: Status: ACTIVE | Noted: 2025-05-20

## 2025-05-20 PROBLEM — R19.09 ABDOMINAL MASS OF OTHER SITE: Status: ACTIVE | Noted: 2025-05-20

## 2025-05-20 LAB
ANION GAP SERPL CALCULATED.3IONS-SCNC: 10 MMOL/L (ref 3–16)
BASOPHILS # BLD: 0.1 K/UL (ref 0–0.2)
BASOPHILS NFR BLD: 0.9 %
BUN SERPL-MCNC: 29 MG/DL (ref 7–20)
CALCIUM SERPL-MCNC: 8.8 MG/DL (ref 8.3–10.6)
CANCER AG125 SERPL-ACNC: 30.1 U/ML (ref 0–35)
CEA SERPL-MCNC: 1.5 NG/ML (ref 0–5)
CHLORIDE SERPL-SCNC: 102 MMOL/L (ref 99–110)
CO2 SERPL-SCNC: 25 MMOL/L (ref 21–32)
CREAT SERPL-MCNC: 1.6 MG/DL (ref 0.8–1.3)
DEPRECATED RDW RBC AUTO: 14.2 % (ref 12.4–15.4)
EOSINOPHIL # BLD: 0.2 K/UL (ref 0–0.6)
EOSINOPHIL NFR BLD: 3.1 %
GFR SERPLBLD CREATININE-BSD FMLA CKD-EPI: 44 ML/MIN/{1.73_M2}
GLUCOSE SERPL-MCNC: 149 MG/DL (ref 70–99)
HCT VFR BLD AUTO: 38.6 % (ref 40.5–52.5)
HGB BLD-MCNC: 13.2 G/DL (ref 13.5–17.5)
LYMPHOCYTES # BLD: 1.5 K/UL (ref 1–5.1)
LYMPHOCYTES NFR BLD: 24.4 %
MCH RBC QN AUTO: 29.6 PG (ref 26–34)
MCHC RBC AUTO-ENTMCNC: 34.1 G/DL (ref 31–36)
MCV RBC AUTO: 86.7 FL (ref 80–100)
MONOCYTES # BLD: 0.7 K/UL (ref 0–1.3)
MONOCYTES NFR BLD: 11.4 %
NEUTROPHILS # BLD: 3.6 K/UL (ref 1.7–7.7)
NEUTROPHILS NFR BLD: 60.2 %
PLATELET # BLD AUTO: 219 K/UL (ref 135–450)
PMV BLD AUTO: 7.6 FL (ref 5–10.5)
POTASSIUM SERPL-SCNC: 3.8 MMOL/L (ref 3.5–5.1)
RBC # BLD AUTO: 4.46 M/UL (ref 4.2–5.9)
SODIUM SERPL-SCNC: 137 MMOL/L (ref 136–145)
WBC # BLD AUTO: 6 K/UL (ref 4–11)

## 2025-05-20 PROCEDURE — 2500000003 HC RX 250 WO HCPCS: Performed by: INTERNAL MEDICINE

## 2025-05-20 PROCEDURE — 6370000000 HC RX 637 (ALT 250 FOR IP): Performed by: INTERNAL MEDICINE

## 2025-05-20 PROCEDURE — A9581 GADOXETATE DISODIUM INJ: HCPCS | Performed by: SURGERY

## 2025-05-20 PROCEDURE — G0378 HOSPITAL OBSERVATION PER HR: HCPCS

## 2025-05-20 PROCEDURE — 36415 COLL VENOUS BLD VENIPUNCTURE: CPT

## 2025-05-20 PROCEDURE — 74183 MRI ABD W/O CNTR FLWD CNTR: CPT

## 2025-05-20 PROCEDURE — 6360000004 HC RX CONTRAST MEDICATION: Performed by: SURGERY

## 2025-05-20 PROCEDURE — 99222 1ST HOSP IP/OBS MODERATE 55: CPT | Performed by: SURGERY

## 2025-05-20 PROCEDURE — 86301 IMMUNOASSAY TUMOR CA 19-9: CPT

## 2025-05-20 PROCEDURE — APPNB60 APP NON BILLABLE TIME 46-60 MINS: Performed by: CLINICAL NURSE SPECIALIST

## 2025-05-20 PROCEDURE — 85025 COMPLETE CBC W/AUTO DIFF WBC: CPT

## 2025-05-20 PROCEDURE — 82378 CARCINOEMBRYONIC ANTIGEN: CPT

## 2025-05-20 PROCEDURE — 82105 ALPHA-FETOPROTEIN SERUM: CPT

## 2025-05-20 PROCEDURE — 86304 IMMUNOASSAY TUMOR CA 125: CPT

## 2025-05-20 PROCEDURE — 80048 BASIC METABOLIC PNL TOTAL CA: CPT

## 2025-05-20 RX ADMIN — GADOXETATE DISODIUM 10 ML: 181.43 INJECTION, SOLUTION INTRAVENOUS at 18:12

## 2025-05-20 RX ADMIN — ATORVASTATIN CALCIUM 80 MG: 80 TABLET, FILM COATED ORAL at 20:49

## 2025-05-20 RX ADMIN — LOSARTAN POTASSIUM 25 MG: 25 TABLET, FILM COATED ORAL at 08:47

## 2025-05-20 RX ADMIN — HYDROCHLOROTHIAZIDE 25 MG: 25 TABLET ORAL at 08:47

## 2025-05-20 RX ADMIN — Medication 10 ML: at 08:47

## 2025-05-20 RX ADMIN — Medication 10 ML: at 21:00

## 2025-05-20 RX ADMIN — AMLODIPINE BESYLATE 10 MG: 5 TABLET ORAL at 08:47

## 2025-05-20 NOTE — PLAN OF CARE
Problem: Chronic Conditions and Co-morbidities  Goal: Patient's chronic conditions and co-morbidity symptoms are monitored and maintained or improved  5/20/2025 0923 by Maria Dolores Haddad RN  Outcome: Progressing  Flowsheets (Taken 5/20/2025 0923)  Care Plan - Patient's Chronic Conditions and Co-Morbidity Symptoms are Monitored and Maintained or Improved: Monitor and assess patient's chronic conditions and comorbid symptoms for stability, deterioration, or improvement     Problem: Pain  Goal: Verbalizes/displays adequate comfort level or baseline comfort level  5/20/2025 0923 by Maria Dolores Haddad RN  Outcome: Progressing  Flowsheets (Taken 5/20/2025 0923)  Verbalizes/displays adequate comfort level or baseline comfort level:   Encourage patient to monitor pain and request assistance   Assess pain using appropriate pain scale   Administer analgesics based on type and severity of pain and evaluate response   Implement non-pharmacological measures as appropriate and evaluate response   Consider cultural and social influences on pain and pain management

## 2025-05-20 NOTE — H&P
[facility] Medicine History & Physical    V 5.1    Date of Admission: 5/19/2025    Date of Service:  Pt seen/examined on 05/19/25     [x]Admitted to Inpatient with expected LOS greater than two midnights due to medical therapy.  []Placed in Observation status.    Chief Admission Complaint:  abdominal pain    Presenting Admission History:      79 y.o. male who presented to [facility] with abdominal pain.  PMHx significant for CAD, Afib, HTN, HLD, hypothyroidism. Patient developed sharp epigastric pain last night. Pain radiated towards is RUQ. Pain in the morning was improved to a dull ache but was still present. He has never had pain like this before. Patient had a CT abd/pel in the ED and a cystic mass was seen next to his liver.    Assessment/Plan:      Cystic mass  - Noted on CT abd/pel. Unclear etiology  - pain currently resolved  - continue general diet, NPO after midnight  - general surgery consulted, awaiting recs    CAD   - known CAD s/p prior CABG but no evidence of or active signs and/or symptoms of ischemia and/or failure  - Currently controlled on home statin  - vitals documented and reviewed.      Atrial Fibrillation   - Chronic paroxysmal, of unspecified and clinically unable to determine etiology  - Normally rate controlled on NO qing agents  - Anticoagulated at baseline on home Eliquis - held    Hypertension   - Typically controlled on home regimen   - Continue norvasc, HCTZ, losartan    Hyperlipidemia   - Typically controlled on home regimen   - Continue statin   - Follow up with PCP outpatient for medication initiation and/or adjustment as needed.      CKD Stage III  - Baseline Cr:  - Continue to monitor BMP     Hypothyroidism   - Clinically euthyroid on exam   - Continue levothyroxine   - Continue outpatient monitoring as scheduled     Discussed management and the need for Hospitalization of the patient w/ the Emergency Department Provider: Dr. Higgins    CXR: I have reviewed  hernia. BONES: Degenerative changes of the spine. 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 Differential considerations include cystic neoplasm, abscess, evolving hematoma, or possibly pseudocyst if there is a history of previous pancreatitis. I phoned these results to the ordering provider, Bob Palma, at 2:30 p.m. on 5/19/2025. No other significant pathology of the abdomen or pelvis with incidental findings of coronary artery calcification and colonic diverticulosis. Electronically signed by Hailee PHELPS CHEST (2 VW)  Result Date: 5/19/2025  TWO VIEWS OF THE CHEST HISTORY:chest pain COMPARISON: February 7, 2025 PROCEDURE: PA and lateral views of the chest were obtained. FINDINGS: The lungs are clear.  The cardiomediastinal contours are within normal limits There are no visible pleural abnormalities There are no acute osseous abnormalities.     No acute cardiopulmonary disease Electronically signed by Corbin Daley      PCP: Piper Veliz MD    Past Medical History:        Diagnosis Date    Atrial fibrillation (HCC)     BPH (benign prostatic hyperplasia)     CAD (coronary artery disease)     Hyperlipidemia     Hypertension     Thyroid disease     Type 2 diabetes mellitus (HCC)        Past Surgical History:        Procedure Laterality Date    CARDIAC SURGERY  07/2022    CABG    GALLBLADDER SURGERY  2023    JOINT REPLACEMENT      KNEE ARTHROPLASTY         Medications Prior to Admission:   Prior to Admission medications    Medication Sig Start Date End Date Taking? Authorizing Provider   fenofibrate 160 MG tablet TAKE 1 TABLET BY MOUTH DAILY 5/16/25  Yes Jose Contreras MD   levothyroxine (SYNTHROID) 50 MCG tablet Take 1 tablet by mouth Daily 2/25/25  Yes Piper Veliz MD   hydroCHLOROthiazide (HYDRODIURIL) 25 MG tablet Take 1 tablet by mouth every morning 1/7/25  Yes Jan Dawson MD   atorvastatin (LIPITOR) 80 MG

## 2025-05-20 NOTE — PROGRESS NOTES
4 Eyes Skin Assessment and Patient belongings     The patient is being assess for  Admission    I agree that 2 Nurses have performed a thorough Head to Toe Skin Assessment on the patient. ALL assessment sites listed below have been assessed.       Areas assessed by both nurses: Giselle Umana RN / Heron Dumont RN  [x]   Head, Face, and Ears   [x]   Shoulders, Back, and Chest  [x]   Arms, Elbows, and Hands   [x]   Coccyx, Sacrum, and IschIum  [x]   Legs, Feet, and Heels        Does the Patient have Skin Breakdown?  No         Tristan Prevention initiated:  Yes   Wound Care Orders initiated:  NA      [facility] nurse consulted for Pressure Injury (Stage 3,4, Unstageable, DTI, NWPT, and Complex wounds), New and Established Ostomies:  NA      I agree that 2 Nurses have reviewed patient belongings with the patient/family and documented in the flowsheet upon admission or transfer to the unit.     Belongings  Dental Appliances: None  Vision - Corrective Lenses: None  Hearing Aid: None  Clothing: Shorts, Shirt, Footwear  Jewelry: Watch  Electronic Devices: Camera  Weapons (Notify Protective Services/Security): None  Home Medications: None  Valuables Given To: Patient  Provide Name(s) of Who Valuable(s) Were Given To: na       Nurse 1 eSignature: Electronically signed by Steph Umana RN on 5/20/25 at 6:37 AM EDT    **SHARE this note so that the co-signing nurse is able to place an eSignature**    Nurse 2 eSignature: Electronically signed by Heron Dumnot RN on 5/20/25 at 6:49 AM EDT

## 2025-05-20 NOTE — CARE COORDINATION
Case Management Assessment  Initial Evaluation    Date/Time of Evaluation: 5/20/2025 1:57 PM  Assessment Completed by: Liseth Barton RN    If patient is discharged prior to next notation, then this note serves as note for discharge by case management.    Patient Name: Jimmy Hubbard                   YOB: 1945  Diagnosis: Abdominal pain, epigastric [R10.13]  Abdominal pain [R10.9]  Abnormal CT of the abdomen [R93.5]                   Date / Time: 5/19/2025  4:42 PM    Patient Admission Status: Inpatient   Readmission Risk (Low < 19, Mod (19-27), High > 27): Readmission Risk Score: 11.6    Current PCP: Piper Veliz MD  PCP verified by CM? Yes    Chart Reviewed: Yes      History Provided by: Patient  Patient Orientation: Alert and Oriented, Person, Place, Situation, Self    Patient Cognition: Alert    Hospitalization in the last 30 days (Readmission):  No    If yes, Readmission Assessment in  Navigator will be completed.    Advance Directives:      Code Status: Full Code   Patient's Primary Decision Maker is: Legal Next of Kin    Primary Decision Maker: aaron deluca - Domestic Partner - [phone]    Primary Decision Maker: idalia carrera - Child - [phone]    Discharge Planning:    Patient lives with: Spouse/Significant Other Type of Home: House  Primary Care Giver: Self  Patient Support Systems include: Spouse/Significant Other   Current Financial resources: Other (Comment) (na)  Current community resources: Other (Comment)  Current services prior to admission: None            Current DME:              Type of Home Care services:  None    ADLS  Prior functional level: Independent in ADLs/IADLs  Current functional level: Independent in ADLs/IADLs    PT AM-PAC:   /24  OT AM-PAC:   /24    Family can provide assistance at DC: Yes  Would you like Case Management to discuss the discharge plan with any other family members/significant others, and if so, who? No  Plans to Return to Present  Housing: Yes  Other Identified Issues/Barriers to RETURNING to current housing: none  Potential Assistance needed at discharge: N/A            Potential DME:    Patient expects to discharge to: House  Plan for transportation at discharge:      Financial    Payor: MEDICARE / Plan: MEDICARE PART A AND B / Product Type: *No Product type* /     Does insurance require precert for SNF: No    Potential assistance Purchasing Medications: No  Meds-to-Beds request:        [facility] [id number] - [place], [place] - [address] -  [phone] - F [phone]  [address] [facility] [address]  Phone: [phone] Fax: [phone]    [facility] - [place], [place] - [address] [place] [address] - P [phone] - F [phone]  [address] [place] [address] [address]  [facility] [address]  Phone: [phone] Fax: [phone]      Notes:    Factors facilitating achievement of predicted outcomes: Friend support, Motivated, Cooperative, Pleasant, Sense of humor, and Good insight into deficits    Barriers to discharge: none    Additional Case Management Notes: spoke with patient. Stated lives in ranch style home with SO. IPTA uses no DME denied needs. Liseth Barton RN      The Plan for Transition of Care is related to the following treatment goals of Abdominal pain, epigastric [R10.13]  Abdominal pain [R10.9]  Abnormal CT of the abdomen [R93.5]    IF APPLICABLE: The Patient and/or patient representative Jimmy and his family were provided with a choice of provider and agrees with the discharge plan. Freedom of choice list with basic dialogue that supports the patient's individualized plan of care/goals and shares the quality data associated with the providers was provided to:     Patient Representative Name:       The Patient and/or Patient Representative Agree with the Discharge Plan?      Liseth Barton RN  Case Management Department

## 2025-05-20 NOTE — ED PROVIDER NOTES
[facility] Emergency Department    CHIEF COMPLAINT  Abdominal Pain (Pt reports since having dinner last night, has been having bilateral upper quadrant abdominal pain wrapping to right side. Pt reports pain has been constant. Denies diarrhea, denies emesis. )      SHARED SERVICE VISIT  I have seen and evaluated this patient with my supervising physician, Dr. Ilda Higgins.    HISTORY OF PRESENT ILLNESS  Jimmy Hubbard is a 79 y.o. male who presents to the ED complaining of upper abdominal pain.  He said he started experiencing burning-like sensation in his upper abdomen last night after eating dinner.  Woke up this morning and pain had worsened and does wrap around to the right flank.  Denies any nausea or vomiting.  Denies any loose or bloody stools.  Has not take any medications for symptoms.  Describes the pain as an aching-like sensation at this time. Denies any headache, body ache, fevers or chills.  Denies any coughing or sneezing.  Denies any sore throat or congestion.  Denies any vision changes or dizziness.  Denies any chest pain, shortness of breath, or dyspnea on exertion.  Denies any new onset back pain.  Denies any recent travel or sick contacts.    No other complaints, modifying factors or associated symptoms.     Nursing notes reviewed.   Past Medical History:   Diagnosis Date    Atrial fibrillation (HCC)     BPH (benign prostatic hyperplasia)     CAD (coronary artery disease)     Hyperlipidemia     Hypertension     Thyroid disease     Type 2 diabetes mellitus (HCC)      Past Surgical History:   Procedure Laterality Date    CARDIAC SURGERY  07/2022    CABG    GALLBLADDER SURGERY  2023    JOINT REPLACEMENT      KNEE ARTHROPLASTY       Family History   Family history unknown: Yes     Social History     Socioeconomic History    Marital status: Single     Spouse name: Not on file    Number of children: Not on file    Years of education: Not on file    Highest education

## 2025-05-20 NOTE — DISCHARGE INSTR - COC
Continuity of Care Form    Patient Name: Jimmy Hubbard   :  1945  MRN:  [id number]    Admit date:  2025  Discharge date:  ***    Code Status Order: Full Code   Advance Directives:     Admitting Physician:  Enrique Rowley MD  PCP: Pipre Veliz MD    Discharging Nurse: ***  Discharging Hospital Unit/Room#: 0335/0335-01  Discharging Unit Phone Number: ***    Emergency Contact:   Extended Emergency Contact Information  Primary Emergency Contact: aaron deluca  Mobile Phone: [phone]  Relation: Domestic Partner   needed? No  Secondary Emergency Contact: idalia carrera  Mobile Phone: [phone]  Relation: Child    Past Surgical History:  Past Surgical History:   Procedure Laterality Date    CARDIAC SURGERY  2022    CABG    GALLBLADDER SURGERY      JOINT REPLACEMENT      KNEE ARTHROPLASTY         Immunization History:   Immunization History   Administered Date(s) Administered    COVID-19, MODERNA BLUE border, Primary or Immunocompromised, (age 12y+), IM, 100 mcg/0.5mL 2021, 2021, 2022    DTaP, INFANRIX, (age 6w-6y), IM, 0.5mL 2012    Influenza Virus Vaccine 10/21/2013, 10/02/2014, 10/29/2015, 2016, 2017    Influenza, FLUAD, (age 65 y+), IM, Quadv, 0.5mL 2023    Influenza, FLUARIX, FLULAVAL, FLUZONE, (age 6 mo+), AFLURIA, (age 3 y+), IM, Trivalent PF, 0.5mL 2020    Influenza, FLUZONE High Dose, (age 65 y+), IM, Trivalent PF, 0.5mL 10/17/2024    Pneumococcal, PCV-13, PREVNAR 13, (age 6w+), IM, 0.5mL 2018    Pneumococcal, PPSV23, PNEUMOVAX 23, (age 2y+), SC/IM, 0.5mL 10/02/2014    RSV, AREXVY, (age 60y+), PF, IM, 0.5mL 2023       Active Problems:  Patient Active Problem List   Diagnosis Code    Type 2 diabetes mellitus with chronic kidney disease, without long-term current use of insulin ([facility]) E11.22    Essential hypertension I10    Mixed hyperlipidemia E78.2    Gastroesophageal reflux disease K21.9    Benign prostatic  intake/output data recorded.    Safety Concerns:     { RAMSEY Safety Concerns:534628096}    Impairments/Disabilities:      {[facility] Impairments/Disabilities:815662263}    Nutrition Therapy:  Current Nutrition Therapy:   { RAMSEY Diet List:902816012}    Routes of Feeding: {[facility] DME Other Feedings:269780671}  Liquids: {Slp liquid thickness:91130}  Daily Fluid Restriction: {[facility] DME Yes amt example:217846389}  Last Modified Barium Swallow with Video (Video Swallowing Test): {Done Not Done Date:}    Treatments at the Time of Hospital Discharge:   Respiratory Treatments: ***  Oxygen Therapy:  {Therapy; copd oxygen:91048}  Ventilator:    {[facility] Vent List:878759134}    Rehab Therapies: {THERAPEUTIC INTERVENTION:1301070682}  Weight Bearing Status/Restrictions: {[facility] Weight Bearin}  Other Medical Equipment (for information only, NOT a DME order):  {EQUIPMENT:360460103}  Other Treatments: ***    Patient's personal belongings (please select all that are sent with patient):  {[facility] DME Belongings:680266238}    RN SIGNATURE:  {Esignature:551674861}    CASE MANAGEMENT/SOCIAL WORK SECTION    Inpatient Status Date: ***    Readmission Risk Assessment Score:  [facility] RISK OF UNPLANNED READMISSION 2.0             11.6 Total Score        Discharging to Facility/ Agency   Name:   Address:  Phone:  Fax:    Dialysis Facility (if applicable)   Name:  Address:  Dialysis Schedule:  Phone:  Fax:    / signature: {Esignature:313521922}    PHYSICIAN SECTION    Prognosis: {Prognosis:3014446585}    Condition at Discharge: { Patient Condition:026455219}    Rehab Potential (if transferring to Rehab): {Prognosis:7045446210}    Recommended Labs or Other Treatments After Discharge: ***    Physician Certification: I certify the above information and transfer of Jimmy Hubbard  is necessary for the continuing treatment of the diagnosis listed and that he requires {Admit to Appropriate Level of Care:57186} for

## 2025-05-20 NOTE — PLAN OF CARE
Problem: Chronic Conditions and Co-morbidities  Goal: Patient's chronic conditions and co-morbidity symptoms are monitored and maintained or improved  5/19/2025 2205 by Steph Umana, RN  Flowsheets (Taken 5/19/2025 2205)  Care Plan - Patient's Chronic Conditions and Co-Morbidity Symptoms are Monitored and Maintained or Improved:   Monitor and assess patient's chronic conditions and comorbid symptoms for stability, deterioration, or improvement   Collaborate with multidisciplinary team to address chronic and comorbid conditions and prevent exacerbation or deterioration   Update acute care plan with appropriate goals if chronic or comorbid symptoms are exacerbated and prevent overall improvement and discharge  5/19/2025 2002 by Abril Brown RN  Outcome: Progressing  Flowsheets (Taken 5/19/2025 2002)  Care Plan - Patient's Chronic Conditions and Co-Morbidity Symptoms are Monitored and Maintained or Improved:   Monitor and assess patient's chronic conditions and comorbid symptoms for stability, deterioration, or improvement   Collaborate with multidisciplinary team to address chronic and comorbid conditions and prevent exacerbation or deterioration   Update acute care plan with appropriate goals if chronic or comorbid symptoms are exacerbated and prevent overall improvement and discharge

## 2025-05-20 NOTE — PROGRESS NOTES
Pt now agitated as he has not eaten for 26 hours, requesting diet to be followed up, verbalized he would leave. Notified Dr. East via [employer].

## 2025-05-20 NOTE — CONSULTS
Department of General Surgery Consult    PATIENT NAME: Jimmy Hubbard   YOB: 1945    ADMISSION DATE: 5/19/2025  4:42 PM      TODAY'S DATE: 5/20/2025    Reason for Consult:  cystic abd mass    Chief Complaint: abd pain    Historian: patient, EMR    Requesting Practitioner:  Halley    HISTORY OF PRESENT ILLNESS:              The patient is a 79 y.o. male who presents with complaints of abdominal pain that started 2 nights ago after eating dinner. He reports the pain was initially upper abd, but by the morning started to be more on the left side. This morning he denies abd pain. He reports no associated symptoms such as nausea or vomiting.   He is on eliquis for Afib.     Past Medical History:        Diagnosis Date    Atrial fibrillation (HCC)     BPH (benign prostatic hyperplasia)     CAD (coronary artery disease)     Hyperlipidemia     Hypertension     Thyroid disease     Type 2 diabetes mellitus (HCC)        Past Surgical History:        Procedure Laterality Date    CARDIAC SURGERY  07/2022    CABG    GALLBLADDER SURGERY  2023    JOINT REPLACEMENT      KNEE ARTHROPLASTY         Current Medications:   Current Facility-Administered Medications: amLODIPine (NORVASC) tablet 10 mg, 10 mg, Oral, Daily  atorvastatin (LIPITOR) tablet 80 mg, 80 mg, Oral, Nightly  finasteride (PROSCAR) tablet 5 mg, 5 mg, Oral, Daily  hydroCHLOROthiazide (HYDRODIURIL) tablet 25 mg, 25 mg, Oral, QAM  levothyroxine (SYNTHROID) tablet 50 mcg, 50 mcg, Oral, Daily  losartan (COZAAR) tablet 25 mg, 25 mg, Oral, Daily  pantoprazole (PROTONIX) tablet 40 mg, 40 mg, Oral, QAM AC  tamsulosin (FLOMAX) capsule 0.4 mg, 0.4 mg, Oral, Daily  sodium chloride flush 0.9 % injection 5-40 mL, 5-40 mL, IntraVENous, 2 times per day  sodium chloride flush 0.9 % injection 5-40 mL, 5-40 mL, IntraVENous, PRN  0.9 % sodium chloride infusion, , IntraVENous, PRN  ondansetron (ZOFRAN-ODT) disintegrating tablet 4 mg, 4 mg, Oral, Q8H PRN **OR**  05/19/25  1304 05/20/25  0509   * 137   K 3.6 3.8   CL 97* 102   CO2 25 25   BUN 28* 29*   CREATININE 1.5* 1.6*   GLUCOSE 152* 149*     Hepatic:   Recent Labs     05/19/25  1304   AST 18   ALT 24   BILITOT 1.1*   ALKPHOS 35*     Mag:    No results for input(s): \"MG\" in the last 72 hours.   Phos:   No results for input(s): \"PHOS\" in the last 72 hours.   INR:   Recent Labs     05/19/25  1717   INR 1.35*       Radiology Review: Images personally reviewed by me.   CT ABDOMEN PELVIS W IV CONTRAST    CLINICAL HISTORY: upper abdominal pain after eating    Comparison: None.    TECHNIQUE: CT images obtained and were reconstructed in axial, coronal and  sagittal planes. IV contrast was administered.    Automated exposure control, iterative reconstruction, and/or weight based  adjustment of the mA/kV was utilized to reduce the radiation dose to as low as  reasonable achievable.    FINDINGS:      LOWER CHEST: Moderate coronary artery calcification. Interstitial lung  abnormality in the bases.    LIVER: Centered in the gastrohepatic ligament involving the lateral segment of  the left lobe of the liver is a predominantly cystic encapsulated lesion  measuring 9 x 8.5 cm with intermediate/soft tissue density along the inferior  margin. Mild increased density in the fat of the gastrohepatic ligament. No  other focal liver lesion.    SPLEEN: Unremarkable    PANCREAS: No pancreatic mass. Mild peripancreatic inflammation around the neck  in the region of above described complex cystic lesion. No pancreatic mass.  Pancreatic duct is not dilated.    ADRENALS: Unremarkable    GALLBLADDER/BILE DUCTS: Prior cholecystectomy. No biliary duct dilatation.    KIDNEYS: Small right renal cysts. No hydronephrosis. Left kidney unremarkable    GASTROINTESTINAL TRACT: There are a few diverticula of the left colon without  inflammatory change or diverticulitis. The appendix is normal. Small bowel and  stomach are unremarkable.      LYMPH NODES:

## 2025-05-20 NOTE — PLAN OF CARE
Problem: Chronic Conditions and Co-morbidities  Goal: Patient's chronic conditions and co-morbidity symptoms are monitored and maintained or improved  Outcome: Progressing  Flowsheets (Taken 5/19/2025 2002)  Care Plan - Patient's Chronic Conditions and Co-Morbidity Symptoms are Monitored and Maintained or Improved:   Monitor and assess patient's chronic conditions and comorbid symptoms for stability, deterioration, or improvement   Collaborate with multidisciplinary team to address chronic and comorbid conditions and prevent exacerbation or deterioration   Update acute care plan with appropriate goals if chronic or comorbid symptoms are exacerbated and prevent overall improvement and discharge     Problem: Pain  Goal: Verbalizes/displays adequate comfort level or baseline comfort level  Outcome: Progressing  Flowsheets (Taken 5/19/2025 2002)  Verbalizes/displays adequate comfort level or baseline comfort level:   Encourage patient to monitor pain and request assistance   Assess pain using appropriate pain scale   Administer analgesics based on type and severity of pain and evaluate response   Implement non-pharmacological measures as appropriate and evaluate response

## 2025-05-20 NOTE — PROGRESS NOTES
[facility] Medicine Progress Note  V 5.17      Date of Admission: 5/19/2025    Hospital Day: 2      Chief Admission Complaint:    Chief Complaint   Patient presents with    Abdominal Pain     Pt reports since having dinner last night, has been having bilateral upper quadrant abdominal pain wrapping to right side. Pt reports pain has been constant. Denies diarrhea, denies emesis.      Subjective: Patient seen at bedside today.  He is n.p.o. for MRI abdomen ordered per general surgery.  Patient denies any further symptoms at this time.  Could possibly be discharged home later pending MRI results and if general surgery has any other plans for him.    Presenting Admission History:       79 y.o. male who presented to [facility] with abdominal pain.  PMHx significant for CAD, Afib, HTN, HLD, hypothyroidism. Patient developed sharp epigastric pain last night. Pain radiated towards is RUQ. Pain in the morning was improved to a dull ache but was still present. He has never had pain like this before. Patient had a CT abd/pel in the ED and a cystic mass was seen next to his liver.     Assessment/Plan:      Cystic mass of left liver lobe, unclear etiology  - CT abd/pel with complex cystic mass centered in the gastrohepatic ligament either exophytic from or invading the liver   - MRI abdomen with and without contrast to further evaluate ordered per general surgery  - general surgery consulted, as patient symptoms have resolved, could possibly discharge home today pending MRI results     CAD   - known CAD s/p prior CABG but no evidence of or active signs and/or symptoms of ischemia and/or failure  - Currently controlled on home statin  - vitals documented and reviewed.       Atrial Fibrillation   - Chronic paroxysmal, of unspecified and clinically unable to determine etiology  - Normally rate controlled on NO qing agents  - Anticoagulated at baseline on home Eliquis - held for now pending MRI results    oriented    BP (!) 130/91   Pulse 55   Temp 98.7 °F (37.1 °C) (Oral)   Resp 16   Ht 1.778 m (5' 10\")   Wt 113.4 kg (250 lb)   SpO2 94%   BMI 35.87 kg/m²     Telemetry:      Personally reviewed and interpreted telemetry (Rhythm Strip) on 5/20/2025.  Patient is currently NOT ON tele.    Diet: Diet NPO    DVT Prophylaxis: PPX dose LMWH    Code status: Full Code    PT/OT Eval Status: Not yet ordered    Multi-Disciplinary Rounds with Case Management completed on 5/20/2025 with the following recs:     Anticipated Discharge Location: Home     Anticipated Discharge Day/Date:  today vs tomorrow pending MRI    Barriers to Discharge: Clinical Course and Subspecialty recs    Likely rate limiting factor: pending gen surg recs    --------------------------------------------------    MDM (any 2 required for High level billing)    A. Problems (any 1)  [x] Acute/Chronic Illness/injury posing ongoing threat to life and/or bodily function without ongoing treatment    [] Severe exacerbation of chronic illness    --------------------------------------------------  B. Risk of Treatment (any 1)    [x] Drugs/treatments that require intensive monitoring for toxicity    [] IV ABX (Vancomycin, Aminoglycosides, etc)     [] Post-Cath/Contrast study requiring serial monitoring    [] IV Narcotic analgesia    [] Aggressive IV diuresis    [] Hypertonic Saline    [] Critical electrolyte abnormalities requiring IV replacement    [] Insulin - Scheduled/SSI or Insulin gtt    [] Anticoagulation (Heparin gtt or Coumadin - other anticoagulants in special circumstances)    [] Cardiac Medications (IV Amiodarone/Diltiazem, Tikosyn, etc)    [] Hemodialysis    [] Other -    [] Change in code status    [] Decision to escalate care    [] Major surgery/procedure with associated risk factors    --------------------------------------------------  C. Data (any 2)    [x] Data Review (any 3)    [x] Consultant notes from yesterday/today    [x] All available

## 2025-05-20 NOTE — PROGRESS NOTES
Pt assessment completed and charted. VSS. Pt a/ox4. Pt reports discomfort in abd but not pain. Pt denies nausea. Pt independent in room. Pt denies any other needs at this time.  Pt calls out appropriately.       Pt is a fall risk;  -Bed in lowest position and wheels locked.  -Call light within reach.   -Bedside table within reach.   -Non-skid footwear in place.

## 2025-05-21 ENCOUNTER — TELEPHONE (OUTPATIENT)
Dept: SURGERY | Age: 80
End: 2025-05-21

## 2025-05-21 VITALS
DIASTOLIC BLOOD PRESSURE: 78 MMHG | SYSTOLIC BLOOD PRESSURE: 142 MMHG | RESPIRATION RATE: 20 BRPM | OXYGEN SATURATION: 97 % | HEIGHT: 70 IN | HEART RATE: 70 BPM | WEIGHT: 250 LBS | TEMPERATURE: 98.1 F | BODY MASS INDEX: 35.79 KG/M2

## 2025-05-21 LAB
ALBUMIN SERPL-MCNC: 4.2 G/DL (ref 3.4–5)
ALBUMIN/GLOB SERPL: 1.6 {RATIO} (ref 1.1–2.2)
ALP SERPL-CCNC: 39 U/L (ref 40–129)
ALT SERPL-CCNC: 21 U/L (ref 10–40)
ANION GAP SERPL CALCULATED.3IONS-SCNC: 12 MMOL/L (ref 3–16)
AST SERPL-CCNC: 21 U/L (ref 15–37)
BASOPHILS # BLD: 0.1 K/UL (ref 0–0.2)
BASOPHILS NFR BLD: 0.9 %
BILIRUB SERPL-MCNC: 0.7 MG/DL (ref 0–1)
BUN SERPL-MCNC: 28 MG/DL (ref 7–20)
CALCIUM SERPL-MCNC: 9.6 MG/DL (ref 8.3–10.6)
CHLORIDE SERPL-SCNC: 99 MMOL/L (ref 99–110)
CO2 SERPL-SCNC: 24 MMOL/L (ref 21–32)
CREAT SERPL-MCNC: 1.6 MG/DL (ref 0.8–1.3)
DEPRECATED RDW RBC AUTO: 14 % (ref 12.4–15.4)
EOSINOPHIL # BLD: 0.2 K/UL (ref 0–0.6)
EOSINOPHIL NFR BLD: 3.7 %
GFR SERPLBLD CREATININE-BSD FMLA CKD-EPI: 44 ML/MIN/{1.73_M2}
GLUCOSE SERPL-MCNC: 147 MG/DL (ref 70–99)
HCT VFR BLD AUTO: 45.2 % (ref 40.5–52.5)
HGB BLD-MCNC: 15.5 G/DL (ref 13.5–17.5)
LIPASE SERPL-CCNC: 37 U/L (ref 13–60)
LYMPHOCYTES # BLD: 1.5 K/UL (ref 1–5.1)
LYMPHOCYTES NFR BLD: 23.4 %
MCH RBC QN AUTO: 29.8 PG (ref 26–34)
MCHC RBC AUTO-ENTMCNC: 34.2 G/DL (ref 31–36)
MCV RBC AUTO: 87.1 FL (ref 80–100)
MONOCYTES # BLD: 0.6 K/UL (ref 0–1.3)
MONOCYTES NFR BLD: 9.1 %
NEUTROPHILS # BLD: 4 K/UL (ref 1.7–7.7)
NEUTROPHILS NFR BLD: 62.9 %
PLATELET # BLD AUTO: 265 K/UL (ref 135–450)
PMV BLD AUTO: 7.7 FL (ref 5–10.5)
POTASSIUM SERPL-SCNC: 3.7 MMOL/L (ref 3.5–5.1)
PROT SERPL-MCNC: 6.8 G/DL (ref 6.4–8.2)
RBC # BLD AUTO: 5.19 M/UL (ref 4.2–5.9)
SODIUM SERPL-SCNC: 135 MMOL/L (ref 136–145)
WBC # BLD AUTO: 6.4 K/UL (ref 4–11)

## 2025-05-21 PROCEDURE — 36415 COLL VENOUS BLD VENIPUNCTURE: CPT

## 2025-05-21 PROCEDURE — 2500000003 HC RX 250 WO HCPCS: Performed by: INTERNAL MEDICINE

## 2025-05-21 PROCEDURE — 6370000000 HC RX 637 (ALT 250 FOR IP): Performed by: INTERNAL MEDICINE

## 2025-05-21 PROCEDURE — 99232 SBSQ HOSP IP/OBS MODERATE 35: CPT | Performed by: SURGERY

## 2025-05-21 PROCEDURE — 85025 COMPLETE CBC W/AUTO DIFF WBC: CPT

## 2025-05-21 PROCEDURE — G0378 HOSPITAL OBSERVATION PER HR: HCPCS

## 2025-05-21 PROCEDURE — 80053 COMPREHEN METABOLIC PANEL: CPT

## 2025-05-21 PROCEDURE — 1200000000 HC SEMI PRIVATE

## 2025-05-21 PROCEDURE — 83690 ASSAY OF LIPASE: CPT

## 2025-05-21 RX ADMIN — LEVOTHYROXINE SODIUM 50 MCG: 0.05 TABLET ORAL at 06:23

## 2025-05-21 RX ADMIN — Medication 10 ML: at 09:14

## 2025-05-21 RX ADMIN — HYDROCHLOROTHIAZIDE 25 MG: 25 TABLET ORAL at 09:13

## 2025-05-21 RX ADMIN — PANTOPRAZOLE SODIUM 40 MG: 40 TABLET, DELAYED RELEASE ORAL at 06:23

## 2025-05-21 RX ADMIN — LOSARTAN POTASSIUM 25 MG: 25 TABLET, FILM COATED ORAL at 09:13

## 2025-05-21 RX ADMIN — AMLODIPINE BESYLATE 10 MG: 5 TABLET ORAL at 09:13

## 2025-05-21 ASSESSMENT — PAIN SCALES - GENERAL: PAINLEVEL_OUTOF10: 0

## 2025-05-21 NOTE — DISCHARGE INSTRUCTIONS
Follow up with Dr. Phoebe Hernández   [facility]  [facility] [address], [address], [place] [address] [phone]

## 2025-05-21 NOTE — TELEPHONE ENCOUNTER
Patient states he was seen at [facility] for  Abdominal mass of other site and was referred in to see Dr. Hernández.     Patient would like to schedule a new patient appointment.     Patient can be reached at [phone].

## 2025-05-21 NOTE — PLAN OF CARE
Problem: Pain  Goal: Verbalizes/displays adequate comfort level or baseline comfort level  5/20/2025 2118 by Steph Umana, RN  Flowsheets (Taken 5/20/2025 2118)  Verbalizes/displays adequate comfort level or baseline comfort level:   Administer analgesics based on type and severity of pain and evaluate response   Consider cultural and social influences on pain and pain management   Assess pain using appropriate pain scale   Implement non-pharmacological measures as appropriate and evaluate response  5/20/2025 0923 by Maria Dolores Haddad, RN  Outcome: Progressing  Flowsheets (Taken 5/20/2025 0923)  Verbalizes/displays adequate comfort level or baseline comfort level:   Encourage patient to monitor pain and request assistance   Assess pain using appropriate pain scale   Administer analgesics based on type and severity of pain and evaluate response   Implement non-pharmacological measures as appropriate and evaluate response   Consider cultural and social influences on pain and pain management

## 2025-05-21 NOTE — PROGRESS NOTES
Patient discharged from facility to home. Patient denied questions. Patient taken out by volunteer services to car port. Patient taken home by friend. IV removed. Patient understood follow up instructions at discharge. No meds to .

## 2025-05-21 NOTE — PROGRESS NOTES
[facility]    PATIENT NAME: Jimmy Hubbard     TODAY'S DATE: 5/21/2025    CHIEF COMPLAINT: none    INTERVAL HISTORY/HPI:    Pt without pain or nausea.     REVIEW OF SYSTEMS:  Pertinent positives and negatives as per interval history section    OBJECTIVE:  VITALS:  /66   Pulse 66   Temp 98.1 °F (36.7 °C) (Oral)   Resp 17   Ht 1.778 m (5' 10\")   Wt 113.4 kg (250 lb)   SpO2 94%   BMI 35.87 kg/m²     INTAKE/OUTPUT:    No intake/output data recorded.  No intake/output data recorded.    CONSTITUTIONAL:  awake and alert  LUNGS:  Respirations easy and unlabored, no crackles or wheezing  CARD:  regular rate and rhythm  ABDOMEN:  normal bowel sounds, soft, non-distended, non-tender     Data:  CBC:   Recent Labs     05/19/25  1304 05/20/25  0509 05/21/25  0836   WBC 9.0 6.0 6.4   HGB 14.8 13.2* 15.5   HCT 43.2 38.6* 45.2    219 265     BMP:    Recent Labs     05/19/25  1304 05/20/25  0509 05/21/25  0836   * 137 135*   K 3.6 3.8 3.7   CL 97* 102 99   CO2 25 25 24   BUN 28* 29* 28*   CREATININE 1.5* 1.6* 1.6*   GLUCOSE 152* 149* 147*     Hepatic:   Recent Labs     05/19/25  1304 05/21/25  0836   AST 18 21   ALT 24 21   BILITOT 1.1* 0.7   ALKPHOS 35* 39*     Mag:    No results for input(s): \"MG\" in the last 72 hours.   Phos:   No results for input(s): \"PHOS\" in the last 72 hours.   INR:   Recent Labs     05/19/25  1717   INR 1.35*       Radiology Review:  *Imaging personally reviewed by me.   MRI - likely benign cyst with contained hemorrhage within      ASSESSMENT AND PLAN:  80 yo with hemorrhagic liver cyst  Symptoms remain resolved and hgb normal.  Mri results noted - likely a benign cyst with hemorrhage within.  Some of tumor marker labs are pending.  Ok to discharge home.  Have follow up with Hepatobiliary Surgery or PCP for further workup.     Electronically signed by Jimmy Thurman MD     [id number]

## 2025-05-21 NOTE — PROGRESS NOTES
Awake on bed, alert and oriented  x 4. IV on right AC. Denies pain. Side rails x 2. Call light within reach. Will continue to monitor.

## 2025-05-21 NOTE — CARE COORDINATION
Chart reviewed. Care managed by surgery and IM. Received message per UR status downgraded to OVA. Condition code 44 and moon letter given. Patient without complaints. Denied any DCP needs. Liseth Barton RN        CASE MANAGEMENT DISCHARGE SUMMARY      Discharge to: home.       IMM given: patient returned to IP status.   Follow-Up copy of Important Message from Medicare (IMM2) has been explained to patient and/or designated healthcare decision maker (HCDM). Pt and/or HCDM aware that patient is permitted to stay an additional 4 hours prior to discharge to consider an appeal if they feel as though they are being discharged too soon. Patient may discharge as planned if chooses to do so.  Patient/HCDM voice no other concerns or questions regarding this process.        New Durable Medical Equipment ordered/agency: none    Transportation: PVT       Confirmed discharge plan with:     Patient: yes     Family:  yes per patient.          RN, name: Neville Barton RN

## 2025-05-21 NOTE — DISCHARGE SUMMARY
Hospital Medicine Discharge Summary    Patient: Jimmy Hubbard   : 1945     Hospital:  [facility]  Admit Date: 2025   Discharge Date: 25  Disposition:  Home   Code status:  Full  Condition at Discharge: Stable  Primary Care Provider: Piper Veliz MD    Admitting Provider: Enrique Rowley MD  Discharge Provider: TRACIE Gore     Discharge Diagnoses:      Active Hospital Problems    Diagnosis     Abdominal pain, epigastric [R10.13]     Abdominal mass of other site [R19.09]     Abdominal pain [R10.9]        Presenting Admission History:      79 y.o. male who presented to [facility] with abdominal pain. PMHx significant for CAD, Afib, HTN, HLD, hypothyroidism. Patient developed sharp epigastric pain last night. Pain radiated towards is RUQ. Pain in the morning was improved to a dull ache but was still present. He has never had pain like this before. Patient had a CT abd/pel in the ED and a cystic mass was seen next to his liver.      Assessment/Plan:      Large complex hemorrhagic cystic abnormality along the posterior margin of the liver  - CT abd/pel with complex cystic mass centered in the gastrohepatic ligament either exophytic from or invading the liver   - MRI abdomen with and without contrast noted large complex hemorrhagic cystic abnormality along the posterior margin of the liver which appears to arise from the liver. Mostly exophytic without suspicious enhancement. Underlying malignancy considered less likely, but cannot be excluded. May represent a complex hemorrhagic hepatic cyst or may represent a pseudocyst adjacent to the left lobe of the liver. Possible mild pancreatitis.   - Tumor markers still pending  - Plan for FU with hepatobiliary surgery and PCP for further workup     CAD   - known CAD s/p prior CABG but no evidence of or active signs and/or symptoms of ischemia and/or failure  - Currently controlled on home  exophytic without suspicious enhancement. Underlying malignancy is considered less likely but cannot be excluded. This may represent a complex hemorrhagic hepatic cyst or may represent a pseudocyst adjacent to the left lobe of the liver. The findings are not completely clear. 2. Adjacent fat stranding and edema along the inferior margin of the complex cystic lesion and adjacent to the body and neck of the pancreas which suggests a mild pancreatitis or mild inflammation adjacent to the cyst. 3. Additional benign cysts in the left lobe of the liver and inferior right lobe of the liver as described. 4. Hepatic steatosis. Electronically signed by Aric Cabrera    CT ABDOMEN PELVIS W IV CONTRAST Additional Contrast? None  Result Date: 5/19/2025  CT ABDOMEN PELVIS W IV CONTRAST CLINICAL HISTORY: upper abdominal pain after eating Comparison: None. TECHNIQUE: CT images obtained and were reconstructed in axial, coronal and sagittal planes. IV contrast was administered. Automated exposure control, iterative reconstruction, and/or weight based adjustment of the mA/kV was utilized to reduce the radiation dose to as low as reasonable achievable. FINDINGS:  LOWER CHEST: Moderate coronary artery calcification. Interstitial lung abnormality in the bases. LIVER: Centered in the gastrohepatic ligament involving the lateral segment of the left lobe of the liver is a predominantly cystic encapsulated lesion measuring 9 x 8.5 cm with intermediate/soft tissue density along the inferior margin. Mild increased density in the fat of the gastrohepatic ligament. No other focal liver lesion. SPLEEN: Unremarkable PANCREAS: No pancreatic mass. Mild peripancreatic inflammation around the neck in the region of above described complex cystic lesion. No pancreatic mass. Pancreatic duct is not dilated. ADRENALS: Unremarkable GALLBLADDER/BILE DUCTS: Prior cholecystectomy. No biliary duct dilatation. KIDNEYS: Small right renal cysts. No

## 2025-05-22 ENCOUNTER — CARE COORDINATION (OUTPATIENT)
Dept: CASE MANAGEMENT | Age: 80
End: 2025-05-22

## 2025-05-22 NOTE — CARE COORDINATION
and red flags with patient. The patient was given an opportunity to ask questions; all questions answered at this time.. The patient verbalized understanding.   Were discharge instructions available to patient? Yes.   Reviewed appropriate site of care based on symptoms and resources available to patient including: PCP  Specialist  When to call 911. The patient agrees to contact the primary care provider and/or specialist office for questions related to their healthcare.      Advance Care Planning:   Does patient have an Advance Directive: reviewed and current.  Advance Directives:       Code Status: Full Code   Patient's Primary Decision Maker is: Legal Next of Kin    Primary Decision Maker: aaron deluca - Domestic Partner - [phone]    Primary Decision Maker: idalia carrera - Child - [phone]    Medication Review:  Medication review was not performed during this call, patient comfortable with medications and did not want to review.     Remote Patient Monitoring:  Offered patient enrollment in the Remote Patient Monitoring (RPM) program for in-home monitoring: Deferred at this time because unable to discuss at initial call; will discuss at next outreach.    Assessments:  Care Transitions 24 Hour Call    Care Transitions Interventions          Follow Up Appointment:   Discussed follow up appointments. Patient has hospital follow up appointment scheduled within 7 days of discharge.   Future Appointments         Provider Specialty Dept Phone    5/30/2025 2:15 PM Kashmir Hernández MD Surgical Oncology [phone]    6/5/2025 3:15 PM Jose Contreras MD Cardiology [phone]    6/16/2025 2:15 PM Jan Dawson MD Nephrology [phone]    6/17/2025 2:45 PM Piper Veliz MD Primary Care [phone]            Care Transition Nurse provided contact information.  Plan for follow-up call in 6-10 days based on severity of symptoms and risk factors.  Plan for next call: self management-       Adore

## 2025-05-22 NOTE — CARE COORDINATION
Care Transitions Note    Initial Call - Call within 2 business days of discharge: Yes    Attempted to reach patient for transitions of care follow up. Unable to reach patient.    Outreach Attempts:   Spoke to patient, but stated he was shopping at [employer]. CTN offered to call later in the day and patient agreed. CTN rescheduled call for end of the day. Patient appreciative. Stated he is \"doing great\".     Patient: Jimmy Hubbard    Patient : 1945   MRN: [id number]    Reason for Admission: abdominal pain  Discharge Date: 25  RURS: Readmission Risk Score: 10.2    Last Discharge Facility       Date Complaint Diagnosis Description Type Department Provider    25 Abdominal Pain Abdominal pain, epigastric ... ED to Hosp-Admission (Discharged) (ADMITTED) Enrique Phan MD; Ilda Higgins..            Was this an external facility discharge? No    Follow Up Appointment:   Patient has hospital follow up appointment scheduled within 14 days of discharge.    Future Appointments         Provider Specialty Dept Phone    2025 2:15 PM Kashmir Hernández MD Surgical Oncology [phone]    2025 3:15 PM Jose Contreras MD Cardiology [phone]    2025 2:15 PM Jan Dawson MD Nephrology [phone]    2025 2:45 PM Piper Veliz MD Primary Care [phone]            Plan for follow-up on next business day.      Adore Dill RN

## 2025-05-23 LAB
AFP-TM SERPL-MCNC: 3.1 UG/L
CANCER AG19-9 SERPL IA-ACNC: 2886 U/ML (ref 0–35)

## 2025-05-28 RX ORDER — SILDENAFIL 100 MG/1
50-100 TABLET, FILM COATED ORAL DAILY PRN
COMMUNITY
Start: 2025-04-28

## 2025-05-28 NOTE — TELEPHONE ENCOUNTER
MARIAH BARBOZA     Patient last seen on 7/30/24. Advised to follow up 6 months. Next appointment 6/5/25.       Lab Results   Component Value Date    WBC 6.4 05/21/2025    HGB 15.5 05/21/2025    HCT 45.2 05/21/2025    MCV 87.1 05/21/2025     05/21/2025

## 2025-05-29 ENCOUNTER — TELEPHONE (OUTPATIENT)
Dept: SURGERY | Age: 80
End: 2025-05-29

## 2025-05-29 RX ORDER — APIXABAN 5 MG/1
5 TABLET, FILM COATED ORAL 2 TIMES DAILY
Qty: 180 TABLET | Refills: 3 | Status: SHIPPED | OUTPATIENT
Start: 2025-05-29

## 2025-05-29 NOTE — PROGRESS NOTES
stranding and edema along the inferior margin of the complex cystic lesion and adjacent to the body and neck of the pancreas which suggests a mild pancreatitis or mild inflammation adjacent to the cyst.  Additional benign cysts in the left lobe of the liver and inferior right lobe of the liver as described.  Hepatic steatosis.       Assessment/Plan:      Diagnosis Orders   1. Liver cyst        79 year old gentleman with hemorrhagic liver cyst at left lobe of liver.     Imaging independently reviewed and interpreted by me, also reviewed with Jimmy and his wife: liver cyst with likely recent bleeding. CA 19.9 is high but imaging does not show any suspicious findings.     Discussed options for management including liver resection assuming neoplasm vs liver cyst surgery. Plan is to do liver cyst surgery for now. I explained him about laparoscopic / robotic surgery. Printed information was given. All the complications including bile leak were explained. Risks, benefits and alternatives of surgery explained to the patient. Explained that surgery will be high risk given his age and comorbid conditions.  Further management will depend on final pathology. All the questions of the patient are answered to his apparent satisfaction. Patient verbalized understanding of the management plan. My office will schedule the surgery and will inform the patient about preoperative instructions.  Will obtain old imaging and review.     Kashmir Hernández MD  Surgery Attending

## 2025-05-29 NOTE — TELEPHONE ENCOUNTER
Message left for patient informing him that Dr. Hernández will not be in the office tomorrow afternoon and can he come in earlier.

## 2025-05-30 ENCOUNTER — OFFICE VISIT (OUTPATIENT)
Dept: SURGERY | Age: 80
End: 2025-05-30
Payer: MEDICARE

## 2025-05-30 ENCOUNTER — CARE COORDINATION (OUTPATIENT)
Dept: CASE MANAGEMENT | Age: 80
End: 2025-05-30

## 2025-05-30 VITALS
SYSTOLIC BLOOD PRESSURE: 143 MMHG | HEART RATE: 87 BPM | BODY MASS INDEX: 36.29 KG/M2 | TEMPERATURE: 98 F | WEIGHT: 253.5 LBS | HEIGHT: 70 IN | DIASTOLIC BLOOD PRESSURE: 81 MMHG

## 2025-05-30 DIAGNOSIS — K76.89 LIVER CYST: Primary | ICD-10-CM

## 2025-05-30 PROCEDURE — 3079F DIAST BP 80-89 MM HG: CPT | Performed by: SURGERY

## 2025-05-30 PROCEDURE — 3077F SYST BP >= 140 MM HG: CPT | Performed by: SURGERY

## 2025-05-30 PROCEDURE — G8427 DOCREV CUR MEDS BY ELIG CLIN: HCPCS | Performed by: SURGERY

## 2025-05-30 PROCEDURE — 1111F DSCHRG MED/CURRENT MED MERGE: CPT | Performed by: SURGERY

## 2025-05-30 PROCEDURE — 1036F TOBACCO NON-USER: CPT | Performed by: SURGERY

## 2025-05-30 PROCEDURE — 1123F ACP DISCUSS/DSCN MKR DOCD: CPT | Performed by: SURGERY

## 2025-05-30 PROCEDURE — G8417 CALC BMI ABV UP PARAM F/U: HCPCS | Performed by: SURGERY

## 2025-05-30 PROCEDURE — 99205 OFFICE O/P NEW HI 60 MIN: CPT | Performed by: SURGERY

## 2025-05-30 NOTE — CARE COORDINATION
Care Transitions Note    Follow Up Call     Patient Current Location:  Home: [address] [place] [address] [address] [facility] [id number]    Care Transition Nurse contacted the patient by telephone. Verified name and  as identifiers.    Additional needs identified to be addressed with provider   No needs identified         Method of communication with provider: none.    Care Summary Note: Patient answered call and verified . Patient pleasant and agreeable to transition call. Patient was seen by surgical oncology provider, Dr Hernández this morning. Patient stated the meeting was \"excellent\"  and happy with how visit went. Patient will be getting a repeat CT scan to compare to the CT scan he had 4 years ago. Patient to follow up after provider reviews prior CT scan. Denied any pain. Appetite is good and denied any acute needs at present time.  Agreeable to f/u calls.  Educated on the use of urgent care or physician’s 24 hr access line if assistance is needed after hours.      Plan of care updates since last contact:  Review of patient management of conditions/medications:         Advance Care Planning:   Does patient have an Advance Directive: reviewed during previous call, see note. .    Medication Review:  Full medication reconciliation completed during previous call.    Remote Patient Monitoring:  Offered patient enrollment in the Remote Patient Monitoring (RPM) program for in-home monitoring: Yes, but did not enroll at this time: controlled chronic disease management.    Assessments:  Care Transitions Subsequent and Final Call    Subsequent and Final Calls  Care Transitions Interventions  Other Interventions:              Follow Up Appointment:   DUSTIN appointment attended as scheduled   Future Appointments         Provider Specialty Dept Phone    2025 3:15 PM Jose Contreras MD Cardiology [phone]    2025 2:15 PM Jan Dawson MD Nephrology [phone]    2025 2:45 PM Piper Veliz MD Primary Care

## 2025-06-01 NOTE — PROGRESS NOTES
Physician Progress Note      PATIENT:               LIZET CANTU  CSN #:                  [id number]  :                       1945  ADMIT DATE:       2025 4:42 PM  DISCH DATE:        2025 1:14 PM  RESPONDING  PROVIDER #:        PRUDENCE HODGES - NP          QUERY TEXT:    Atrial fibrillation is documented in the H&P  Enrique Rowley MD. Please   clarify the type:    The clinical indicators include:  79 y.o. male with abdominal pain    Age 79, HTN, DM, CAD  \"Atrial Fibrillation-Chronic paroxysmal, of unspecified and clinically unable   to determine etiology.\" (DS  Prudence Hahn, APRKIM)  losartan, amlodipine    Thank you,  Caty Joe, [facility], [facility].  Options provided:  -- Chronic, unspecified  -- Paroxysmal  -- Permanent  -- Chronic persistent  -- Longstanding persistent  -- Other persistent  -- Other - I will add my own diagnosis  -- Disagree - Not applicable / Not valid  -- Disagree - Clinically unable to determine / Unknown  -- Refer to Clinical Documentation Reviewer    PROVIDER RESPONSE TEXT:    The patient's atrial fibrillation is paroxysmal.    Query created by: Caty Joe on 2025 8:15 AM      QUERY TEXT:    Pancreatitis is documented in the DS  by Prudence Hahn, TRACIE). Please   provide additional clinical indicators supportive of your documentation. Or   please document if the diagnosis of Pancreatitis has been ruled out after   study.    The clinical indicators include:  79 y.o. male with abdominal pain    Cystic mass was seen next to his liver. (H&P  Enrique Rowley MD)  Possible mild pancreatitis. (DS  Prudence Hahn, APRKIM)  Adjacent fat stranding and edema along the inferior margin of the complex   cystic lesion and adjacent to the body and neck of the pancreas which suggests   a mild pancreatitis or mild inflammation adjacent to the cyst. (MRI ABD    Aric Cabrera MD)  Lipase 30.0>>37.0  IV fluids, Toradol, radiology

## 2025-06-02 NOTE — PROGRESS NOTES
[facility]   Office consultation  Note  6/5/2025   Ref by Dr Piper Veliz MD  Subjective:  Mr. Hubbard presents today for cardiology follow up CAD,HBP,Mixed HLD, prediabetes, Hypothyroid. Post op afib  GERD, liver cyst having surgery 6.23.25      [place]:   ED visit 8/30/24 for right leg pain.     Admitted 5/19/25 with abdominal pain. A cystic mass was seen next to his liver. MRI of the abdomen showed a large complex hemorrhagic cystic abnormality along the posterior margin of the liver which appeared to arise from the liver. His CA 19-9 was elevated at 2886    Today, he is doing okay. He is here with his wife. He reports that he was having abdominal pain during breakfast he said it hurt so bad that his wife thought he was having a heart attack so they presented to the ED and was found to have a liver cysts. He states anytime he over eats he feels uncomfortable and bloated. He is to have it removed and biopsied 6/23/25 with Dr. Hernández. Patient denies current edema, chest pain, shortness of breath, palpitations, dizziness or syncope. Patient is taking all cardiac medications as prescribed and tolerates them well.     PMH:   Jimmy Hubbard is a 79 y.o. 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  Presented on 1/23/24 to establish cardiac care. \ Moved back to [place]. Prior care at [facility] in [facility].  No prior MI CAD found on routine CCTA.   Reports he [facility] [place]. Ongoing shortness of breath on exertion that is not new. Lisinopril stopped due to a cough. Taking Losartan.        Review of Systems:         12 point ROS negative in all areas as listed below except as in [place]  Constitutional, EENT, , GI, , Musculoskeletal, skin, neurological, hematological, endocrine, Psychiatric    Reviewed past

## 2025-06-04 ENCOUNTER — TELEPHONE (OUTPATIENT)
Dept: SURGERY | Age: 80
End: 2025-06-04

## 2025-06-04 NOTE — TELEPHONE ENCOUNTER
Patient called in requesting to schedule his surgery for the liver cyst     Please contact the patient at [phone]

## 2025-06-04 NOTE — TELEPHONE ENCOUNTER
Patient would like surgery anytime from 6/16/25 on. RN will look for potential dates. Will also be out of town 7/28/25-8/8/25

## 2025-06-04 NOTE — TELEPHONE ENCOUNTER
Patient called in stating that he is scheduled to see his cardiologist tomorrow, 6/5/2025.    Please contact the patient with any questions at [phone]

## 2025-06-04 NOTE — TELEPHONE ENCOUNTER
CT Report requested from [facility] in [place], [place].     [facility] has no records of EGD/Colonoscopy.

## 2025-06-04 NOTE — TELEPHONE ENCOUNTER
MA called [facility] and ask them if they could push patients images and they said they could not, so MA ask them if they could overnight a disc and they said there disc burner was out of order and they have been waiting on a new one for a while now and they still have not got it so they can not ship a disc.

## 2025-06-05 ENCOUNTER — OFFICE VISIT (OUTPATIENT)
Dept: CARDIOLOGY CLINIC | Age: 80
End: 2025-06-05
Payer: MEDICARE

## 2025-06-05 ENCOUNTER — PREP FOR PROCEDURE (OUTPATIENT)
Dept: SURGERY | Age: 80
End: 2025-06-05

## 2025-06-05 ENCOUNTER — PATIENT MESSAGE (OUTPATIENT)
Dept: PRIMARY CARE CLINIC | Age: 80
End: 2025-06-05

## 2025-06-05 VITALS
OXYGEN SATURATION: 96 % | HEART RATE: 70 BPM | BODY MASS INDEX: 35.65 KG/M2 | HEIGHT: 70 IN | DIASTOLIC BLOOD PRESSURE: 78 MMHG | WEIGHT: 249 LBS | SYSTOLIC BLOOD PRESSURE: 120 MMHG

## 2025-06-05 DIAGNOSIS — Q44.6 CYSTIC DISEASE OF LIVER: ICD-10-CM

## 2025-06-05 DIAGNOSIS — E78.2 MIXED HYPERLIPIDEMIA: ICD-10-CM

## 2025-06-05 DIAGNOSIS — Z01.810 PREOP CARDIOVASCULAR EXAM: Primary | ICD-10-CM

## 2025-06-05 DIAGNOSIS — I10 ESSENTIAL HYPERTENSION: ICD-10-CM

## 2025-06-05 DIAGNOSIS — Z95.1 S/P CABG (CORONARY ARTERY BYPASS GRAFT): ICD-10-CM

## 2025-06-05 DIAGNOSIS — I25.10 CORONARY ARTERY DISEASE INVOLVING NATIVE CORONARY ARTERY OF NATIVE HEART WITHOUT ANGINA PECTORIS: ICD-10-CM

## 2025-06-05 DIAGNOSIS — Z79.899 MEDICATION MANAGEMENT: ICD-10-CM

## 2025-06-05 PROCEDURE — 1111F DSCHRG MED/CURRENT MED MERGE: CPT | Performed by: INTERNAL MEDICINE

## 2025-06-05 PROCEDURE — 3074F SYST BP LT 130 MM HG: CPT | Performed by: INTERNAL MEDICINE

## 2025-06-05 PROCEDURE — 1123F ACP DISCUSS/DSCN MKR DOCD: CPT | Performed by: INTERNAL MEDICINE

## 2025-06-05 PROCEDURE — G8427 DOCREV CUR MEDS BY ELIG CLIN: HCPCS | Performed by: INTERNAL MEDICINE

## 2025-06-05 PROCEDURE — 3078F DIAST BP <80 MM HG: CPT | Performed by: INTERNAL MEDICINE

## 2025-06-05 PROCEDURE — 99214 OFFICE O/P EST MOD 30 MIN: CPT | Performed by: INTERNAL MEDICINE

## 2025-06-05 PROCEDURE — 1036F TOBACCO NON-USER: CPT | Performed by: INTERNAL MEDICINE

## 2025-06-05 PROCEDURE — G8417 CALC BMI ABV UP PARAM F/U: HCPCS | Performed by: INTERNAL MEDICINE

## 2025-06-05 RX ORDER — FENOFIBRATE 160 MG/1
160 TABLET ORAL DAILY
Qty: 90 TABLET | Refills: 3 | Status: SHIPPED | OUTPATIENT
Start: 2025-06-05

## 2025-06-05 RX ORDER — ATORVASTATIN CALCIUM 80 MG/1
80 TABLET, FILM COATED ORAL DAILY
Qty: 90 TABLET | Refills: 2 | Status: SHIPPED | OUTPATIENT
Start: 2025-06-05

## 2025-06-05 NOTE — PATIENT INSTRUCTIONS
Labs reviewed in epic and discussed with patient.  Medications reviewed in office. Medications refilled as warranted  Intermediate risk clinically for high risk procedure. Proceed as planned. Stop taking eliquis one week prior. Resume when it is deemed safe by surgeon.   Recommend to follow the mediterranean diet. Education provided.    Please obtain the following labs in 3 months; -LIPID FASTING, HEPATIC PANEL   Please call the office if you are not taking atorvastatin

## 2025-06-06 ENCOUNTER — CARE COORDINATION (OUTPATIENT)
Dept: CASE MANAGEMENT | Age: 80
End: 2025-06-06

## 2025-06-06 ASSESSMENT — LIFESTYLE VARIABLES
HOW MANY STANDARD DRINKS CONTAINING ALCOHOL DO YOU HAVE ON A TYPICAL DAY: 0
HOW OFTEN DO YOU HAVE A DRINK CONTAINING ALCOHOL: 1
HOW OFTEN DO YOU HAVE SIX OR MORE DRINKS ON ONE OCCASION: 1

## 2025-06-06 NOTE — CARE COORDINATION
Appointment:   Reviewed upcoming appointment(s). and DUSTIN appointment attended as scheduled   Future Appointments         Provider Specialty Dept Phone    6/16/2025 2:15 PM Jan Dawson MD Nephrology [phone]    6/17/2025 2:45 PM Piper Veliz MD Primary Care [phone]    11/20/2025 10:30 AM Jose Contreras MD Cardiology [phone]            Care Transition Nurse provided contact information.  Plan for follow-up call in 6-10 days based on severity of symptoms and risk factors.  Plan for next call: self management-       Adore Dill RN

## 2025-06-09 ENCOUNTER — TELEPHONE (OUTPATIENT)
Dept: PRIMARY CARE CLINIC | Age: 80
End: 2025-06-09

## 2025-06-09 DIAGNOSIS — E78.2 MIXED HYPERLIPIDEMIA: ICD-10-CM

## 2025-06-09 NOTE — PROGRESS NOTES
friction rub.   Pulmonary:      Effort: Pulmonary effort is normal. No respiratory distress.      Breath sounds: Normal breath sounds. No wheezing, rhonchi or rales.   Musculoskeletal:      Right lower leg: No edema.      Left lower leg: No edema.   Skin:     General: Skin is warm and dry.      Findings: No rash.   Neurological:      Mental Status: He is alert and oriented to person, place, and time.      Motor: No weakness.      Gait: Gait normal.   Psychiatric:         Mood and Affect: Mood normal.         Behavior: Behavior normal.       EKG Interpretation: normal sinus rhythm, PAC's noted, unchanged from previous tracings.    Lab Review: Yes    ASSESSMENT:      1. Liver cyst  Recent imaging reviewed.  Recent notes reviewed    2. Pre-op exam  Chart reviewed.  H&P completed  Recent imaging and labs reviewed.  Recent EKG reviewed.  No acute concerns  Patient has received cardiac clearance for upcoming surgery from his cardiologist  Advised to hold Eliquis x 7 days prior to procedure per cardiology.  Last dose 6/15.  Should resume Eliquis postop per surgeons instructions  Advised to take BP meds on the morning of surgery with small sip of water  Okay to proceed with planned procedure    3. Atrial fibrillation, unspecified type (HCC)  Continue per cardiology.  Will hold Eliquis x 7 days prior to procedure.  Has been cleared by cardiology  Recent notes reviewed    4. Coronary artery disease involving native coronary artery of native heart without angina pectoris    5. Chronic anticoagulation    6. Essential hypertension  Stable    7. Type 2 diabetes mellitus with stage 3a chronic kidney disease, without long-term current use of insulin (HCC)  Stable     8. Stage 3a chronic kidney disease (HCC)  Stable.  Continue per nephrology.  Appointment today    9. Gastroesophageal reflux disease, unspecified whether esophagitis present  Stable    10. Mixed hyperlipidemia  Stable     80 y.o. patient approved for Surgery.  Patient

## 2025-06-09 NOTE — TELEPHONE ENCOUNTER
Medication:   Requested Prescriptions     Pending Prescriptions Disp Refills    atorvastatin (LIPITOR) 80 MG tablet [Pharmacy Med Name: ATORVASTATIN 80 MG TABLET] 7 tablet      Sig: TAKE 1 TABLET BY MOUTH DAILY     Last Filled:  06/05/2026    Last appt: 2/7/2025   Next appt: 6/16/2025    Last OARRS:        No data to display

## 2025-06-11 DIAGNOSIS — K76.89 LIVER CYST: Primary | ICD-10-CM

## 2025-06-13 ENCOUNTER — CARE COORDINATION (OUTPATIENT)
Dept: CASE MANAGEMENT | Age: 80
End: 2025-06-13

## 2025-06-13 RX ORDER — INDOCYANINE GREEN AND WATER 25 MG
5 KIT INJECTION
Qty: 2 ML | Refills: 0 | Status: CANCELLED
Start: 2025-06-23 | End: 2025-06-23

## 2025-06-13 NOTE — CARE COORDINATION
Care Transitions Note    Follow Up Call     Attempted to reach patient for transitions of care follow up.  Unable to reach patient.      Outreach Attempts:   HIPAA compliant voicemail left for patient.     Care Summary Note: Attempted to reach patient via phone for transition call.  VM left stating purpose of call along with my contact information requesting a return call.    Follow Up Appointment:   Future Appointments         Provider Specialty Dept Phone    6/16/2025 10:30 AM Arti Rocha APRN - CNP Primary Care [phone]    6/16/2025 2:15 PM Jan Dawson MD Nephrology [phone]    6/17/2025 2:45 PM Piper Veliz MD Primary Care [phone]    7/8/2025 9:45 AM Kashmir Hernández MD Surgical Oncology [phone]    11/20/2025 10:30 AM Jose Contreras MD Cardiology [phone]            Plan for follow-up call in 6-10 days based on severity of symptoms and risk factors. Plan for next call: self management-     Adore Dill RN

## 2025-06-14 SDOH — HEALTH STABILITY: PHYSICAL HEALTH: ON AVERAGE, HOW MANY DAYS PER WEEK DO YOU ENGAGE IN MODERATE TO STRENUOUS EXERCISE (LIKE A BRISK WALK)?: 2 DAYS

## 2025-06-14 SDOH — HEALTH STABILITY: PHYSICAL HEALTH: ON AVERAGE, HOW MANY MINUTES DO YOU ENGAGE IN EXERCISE AT THIS LEVEL?: 20 MIN

## 2025-06-14 ASSESSMENT — PATIENT HEALTH QUESTIONNAIRE - PHQ9
SUM OF ALL RESPONSES TO PHQ QUESTIONS 1-9: 0
2. FEELING DOWN, DEPRESSED OR HOPELESS: NOT AT ALL
SUM OF ALL RESPONSES TO PHQ QUESTIONS 1-9: 0
SUM OF ALL RESPONSES TO PHQ QUESTIONS 1-9: 0
1. LITTLE INTEREST OR PLEASURE IN DOING THINGS: NOT AT ALL
SUM OF ALL RESPONSES TO PHQ QUESTIONS 1-9: 0

## 2025-06-14 ASSESSMENT — LIFESTYLE VARIABLES
HOW OFTEN DO YOU HAVE A DRINK CONTAINING ALCOHOL: 1
HOW OFTEN DO YOU HAVE SIX OR MORE DRINKS ON ONE OCCASION: 1
HOW MANY STANDARD DRINKS CONTAINING ALCOHOL DO YOU HAVE ON A TYPICAL DAY: 0
HOW OFTEN DO YOU HAVE A DRINK CONTAINING ALCOHOL: NEVER
HOW MANY STANDARD DRINKS CONTAINING ALCOHOL DO YOU HAVE ON A TYPICAL DAY: PATIENT DOES NOT DRINK

## 2025-06-16 ENCOUNTER — OFFICE VISIT (OUTPATIENT)
Dept: PRIMARY CARE CLINIC | Age: 80
End: 2025-06-16
Payer: MEDICARE

## 2025-06-16 VITALS
HEART RATE: 72 BPM | WEIGHT: 250 LBS | BODY MASS INDEX: 35.87 KG/M2 | OXYGEN SATURATION: 96 % | SYSTOLIC BLOOD PRESSURE: 128 MMHG | DIASTOLIC BLOOD PRESSURE: 72 MMHG

## 2025-06-16 DIAGNOSIS — E11.22 TYPE 2 DIABETES MELLITUS WITH STAGE 3A CHRONIC KIDNEY DISEASE, WITHOUT LONG-TERM CURRENT USE OF INSULIN (HCC): ICD-10-CM

## 2025-06-16 DIAGNOSIS — K21.9 GASTROESOPHAGEAL REFLUX DISEASE, UNSPECIFIED WHETHER ESOPHAGITIS PRESENT: ICD-10-CM

## 2025-06-16 DIAGNOSIS — K76.89 LIVER CYST: Primary | ICD-10-CM

## 2025-06-16 DIAGNOSIS — Z01.818 PRE-OP EXAM: ICD-10-CM

## 2025-06-16 DIAGNOSIS — E78.2 MIXED HYPERLIPIDEMIA: ICD-10-CM

## 2025-06-16 DIAGNOSIS — I48.91 ATRIAL FIBRILLATION, UNSPECIFIED TYPE (HCC): ICD-10-CM

## 2025-06-16 DIAGNOSIS — N18.31 TYPE 2 DIABETES MELLITUS WITH STAGE 3A CHRONIC KIDNEY DISEASE, WITHOUT LONG-TERM CURRENT USE OF INSULIN (HCC): ICD-10-CM

## 2025-06-16 DIAGNOSIS — Z79.01 CHRONIC ANTICOAGULATION: ICD-10-CM

## 2025-06-16 DIAGNOSIS — I25.10 CORONARY ARTERY DISEASE INVOLVING NATIVE CORONARY ARTERY OF NATIVE HEART WITHOUT ANGINA PECTORIS: ICD-10-CM

## 2025-06-16 DIAGNOSIS — I10 ESSENTIAL HYPERTENSION: ICD-10-CM

## 2025-06-16 DIAGNOSIS — N18.31 STAGE 3A CHRONIC KIDNEY DISEASE (HCC): ICD-10-CM

## 2025-06-16 PROBLEM — R10.9 ABDOMINAL PAIN: Status: RESOLVED | Noted: 2025-05-19 | Resolved: 2025-06-16

## 2025-06-16 PROBLEM — E11.9 TYPE 2 DIABETES MELLITUS WITHOUT COMPLICATION, WITHOUT LONG-TERM CURRENT USE OF INSULIN (HCC): Status: RESOLVED | Noted: 2024-11-18 | Resolved: 2025-06-16

## 2025-06-16 PROCEDURE — 1123F ACP DISCUSS/DSCN MKR DOCD: CPT | Performed by: NURSE PRACTITIONER

## 2025-06-16 PROCEDURE — 1036F TOBACCO NON-USER: CPT | Performed by: NURSE PRACTITIONER

## 2025-06-16 PROCEDURE — 3078F DIAST BP <80 MM HG: CPT | Performed by: NURSE PRACTITIONER

## 2025-06-16 PROCEDURE — 99214 OFFICE O/P EST MOD 30 MIN: CPT | Performed by: NURSE PRACTITIONER

## 2025-06-16 PROCEDURE — G8427 DOCREV CUR MEDS BY ELIG CLIN: HCPCS | Performed by: NURSE PRACTITIONER

## 2025-06-16 PROCEDURE — 1160F RVW MEDS BY RX/DR IN RCRD: CPT | Performed by: NURSE PRACTITIONER

## 2025-06-16 PROCEDURE — 3074F SYST BP LT 130 MM HG: CPT | Performed by: NURSE PRACTITIONER

## 2025-06-16 PROCEDURE — 3051F HG A1C>EQUAL 7.0%<8.0%: CPT | Performed by: NURSE PRACTITIONER

## 2025-06-16 PROCEDURE — G8417 CALC BMI ABV UP PARAM F/U: HCPCS | Performed by: NURSE PRACTITIONER

## 2025-06-16 PROCEDURE — 1159F MED LIST DOCD IN RCRD: CPT | Performed by: NURSE PRACTITIONER

## 2025-06-16 PROCEDURE — 1111F DSCHRG MED/CURRENT MED MERGE: CPT | Performed by: NURSE PRACTITIONER

## 2025-06-16 ASSESSMENT — ENCOUNTER SYMPTOMS
SHORTNESS OF BREATH: 0
VOMITING: 0
DIARRHEA: 0
NAUSEA: 0
COUGH: 0
ABDOMINAL PAIN: 0
WHEEZING: 0

## 2025-06-16 NOTE — TELEPHONE ENCOUNTER
Medication:   Requested Prescriptions     Pending Prescriptions Disp Refills    atorvastatin (LIPITOR) 80 MG tablet 90 tablet 2     Sig: Take 1 tablet by mouth daily     Last Filled:  06/05/2025    Last appt: 6/16/2025   Next appt: 6/17/2025    Last OARRS:        No data to display

## 2025-06-17 ENCOUNTER — OFFICE VISIT (OUTPATIENT)
Dept: PRIMARY CARE CLINIC | Age: 80
End: 2025-06-17

## 2025-06-17 VITALS
OXYGEN SATURATION: 95 % | DIASTOLIC BLOOD PRESSURE: 72 MMHG | SYSTOLIC BLOOD PRESSURE: 126 MMHG | BODY MASS INDEX: 35.65 KG/M2 | RESPIRATION RATE: 16 BRPM | TEMPERATURE: 96.8 F | HEART RATE: 80 BPM | HEIGHT: 70 IN | WEIGHT: 249 LBS

## 2025-06-17 DIAGNOSIS — I10 ESSENTIAL HYPERTENSION: ICD-10-CM

## 2025-06-17 DIAGNOSIS — Z00.00 MEDICARE ANNUAL WELLNESS VISIT, SUBSEQUENT: Primary | ICD-10-CM

## 2025-06-17 DIAGNOSIS — K21.9 GASTROESOPHAGEAL REFLUX DISEASE, UNSPECIFIED WHETHER ESOPHAGITIS PRESENT: ICD-10-CM

## 2025-06-17 DIAGNOSIS — E03.9 ACQUIRED HYPOTHYROIDISM: ICD-10-CM

## 2025-06-17 DIAGNOSIS — E78.2 MIXED HYPERLIPIDEMIA: ICD-10-CM

## 2025-06-17 DIAGNOSIS — K76.89 LIVER CYST: ICD-10-CM

## 2025-06-17 DIAGNOSIS — N18.31 TYPE 2 DIABETES MELLITUS WITH STAGE 3A CHRONIC KIDNEY DISEASE, WITHOUT LONG-TERM CURRENT USE OF INSULIN (HCC): ICD-10-CM

## 2025-06-17 DIAGNOSIS — N18.31 STAGE 3A CHRONIC KIDNEY DISEASE (HCC): ICD-10-CM

## 2025-06-17 DIAGNOSIS — E11.22 TYPE 2 DIABETES MELLITUS WITH STAGE 3A CHRONIC KIDNEY DISEASE, WITHOUT LONG-TERM CURRENT USE OF INSULIN (HCC): ICD-10-CM

## 2025-06-17 RX ORDER — ATORVASTATIN CALCIUM 80 MG/1
80 TABLET, FILM COATED ORAL DAILY
Qty: 7 TABLET | OUTPATIENT
Start: 2025-06-17

## 2025-06-17 NOTE — PROGRESS NOTES
Medicare Annual Wellness Visit    Jimmy Hubbard is here for Medicare AWV    Assessment & Plan  1. Medicare annual wellness visit, subsequent:  - Medicare AWV done    2. Type 2 diabetes mellitus with stage 3a chronic kidney disease:  - Hemoglobin A1c level increased to 7.7% and indicates diabetes is not controlled, target A1c level below 7.0%  - Start Ozempic 0.25 mg subcutaneously weekly  - Limit carbohydrates to 45 grams with meals and 15 grams with snacks  - monitor blood sugars  - goal for blood sugar fasting or pre-meal is   - goal for blood sugar 2 hours after a meal is less than 180  - goal for blood sugar at bedtime is less than 150  - Regular aerobic exercise    3. Essential hypertension   - stable  - continue losartan 50 mg 1/2 tablet once daily  - continue HCTZ 25 mg once daily  - continue amlodipine 10 mg once daily  - low sodium diet  - regular aerobic exercise    4. Mixed Hyperlipidemia:  - not controlled  - LDL is not at goal  - Cholesterol levels previously out of range due to lapse in medication refills  - Patient has resumed medication  - Continue atorvastatin 80 mg once daily   - Continue fenofibrate 160 mg once daily  - low cholesterol, low fat diet  - regular aerobic exercise    5. Acquired Hypothyroidism:  - TSH elevated  - Continue Levothyroxine 50 mcg once daily  - Recheck TSH and may need increase in medication if no improvement    6. Stage 3a chronic kidney disease  - stable  - avoid NSAID's   - continue care per Nephrology    7. Gastroesophageal reflux disease  - stable  -continue omeprazole 40 mg every other day  -Decrease caffeine, avoid spicy foods, avoid tomato based foods  -Eat small meals instead of large meals  -Wait 2-3 hours after eating before lying down         Return in about 2 months (around 8/17/2025) for diabetes, hypertension, and hypothyroidism.     Subjective   The following acute and/or chronic problems were also addressed today:    History of Present

## 2025-06-17 NOTE — PATIENT INSTRUCTIONS
-Low sodium diet  -Low fat, low cholesterol diet  -Limit carbohydrates to 45 grams with meals and 15 grams with snacks  -monitor blood sugars  -goal for blood sugar fasting or pre-meal  is   -goal for blood sugar 2 hours after a meal is less than 180  -goal for blood sugar at bedtime is less than 150  -Regular aerobic exercise       Learning About Being Active as an Older Adult  Why is being active important as you get older?     Being active is one of the best things you can do for your health. And it's never too late to start. Being active--or getting active, if you aren't already--has definite benefits. It can:  Give you more energy,  Keep your mind sharp.  Improve balance to reduce your risk of falls.  Help you manage chronic illness with fewer medicines.  No matter how old you are, how fit you are, or what health problems you have, there is a form of activity that will work for you. And the more physical activity you can do, the better your overall health will be.  What kinds of activity can help you stay healthy?  Being more active will make your daily activities easier. Physical activity includes planned exercise and things you do in daily life. There are four types of activity:  Aerobic.  Doing aerobic activity makes your heart and lungs strong.  Includes walking, dancing, and gardening.  Aim for at least 2½ hours spread throughout the week.  It improves your energy and can help you sleep better.  Muscle-strengthening.  This type of activity can help maintain muscle and strengthen bones.  Includes climbing stairs, using resistance bands, and lifting or carrying heavy loads.  Aim for at least twice a week.  It can help protect the knees and other joints.  Stretching.  Stretching gives you better range of motion in joints and muscles.  Includes upper arm stretches, calf stretches, and gentle yoga.  Aim for at least twice a week, preferably after your muscles are warmed up from other activities.  It can

## 2025-06-17 NOTE — TELEPHONE ENCOUNTER
Patient called and stated he would like to speak with Fredi about his pre op instructions for his upcoming procedure.     Patient can be reached at [phone]

## 2025-06-17 NOTE — TELEPHONE ENCOUNTER
Pre-op Call     Pre-op phone call completed 6/17/2025 11:38 AM     Scheduled procedure: Robotic / laparoscopic liver cyst excision     Arrival date/[facility] at [facility]: Monday June 23rd. 07:30      [x] Reminded to be NPO after midnight  [x] Bowel prep reviewed    Pre-op H&P: PCP  EKG: in epic  Labs: in epic      Medication list reviewed. Yes     Meds that required hold prior to surgery: Jose     Last date taken: 6/19/25         Has ride home from hospital: Yes    Post-op appointment scheduled for: July 8th @ 9:45     Instructed to call with any questions or concerns. Verbalized understanding.

## 2025-06-17 NOTE — H&P (VIEW-ONLY)
Medicare Annual Wellness Visit    Jimmy Hubbard is here for Medicare AWV    Assessment & Plan  1. Medicare annual wellness visit, subsequent:  - Medicare AWV done    2. Type 2 diabetes mellitus with stage 3a chronic kidney disease:  - Hemoglobin A1c level increased to 7.7% and indicates diabetes is not controlled, target A1c level below 7.0%  - Start Ozempic 0.25 mg subcutaneously weekly  - Limit carbohydrates to 45 grams with meals and 15 grams with snacks  - monitor blood sugars  - goal for blood sugar fasting or pre-meal is   - goal for blood sugar 2 hours after a meal is less than 180  - goal for blood sugar at bedtime is less than 150  - Regular aerobic exercise    3. Essential hypertension   - stable  - continue losartan 50 mg 1/2 tablet once daily  - continue HCTZ 25 mg once daily  - continue amlodipine 10 mg once daily  - low sodium diet  - regular aerobic exercise    4. Mixed Hyperlipidemia:  - not controlled  - LDL is not at goal  - Cholesterol levels previously out of range due to lapse in medication refills  - Patient has resumed medication  - Continue atorvastatin 80 mg once daily   - Continue fenofibrate 160 mg once daily  - low cholesterol, low fat diet  - regular aerobic exercise    5. Acquired Hypothyroidism:  - TSH elevated  - Continue Levothyroxine 50 mcg once daily  - Recheck TSH and may need increase in medication if no improvement    6. Stage 3a chronic kidney disease  - stable  - avoid NSAID's   - continue care per Nephrology    7. Gastroesophageal reflux disease  - stable  -continue omeprazole 40 mg every other day  -Decrease caffeine, avoid spicy foods, avoid tomato based foods  -Eat small meals instead of large meals  -Wait 2-3 hours after eating before lying down         Return in about 2 months (around 8/17/2025) for diabetes, hypertension, and hypothyroidism.     Subjective   The following acute and/or chronic problems were also addressed today:    History of Present  (NORVASC) 10 MG tablet Take 1 tablet by mouth daily Yes Jan Dawson MD   zinc 50 MG TABS tablet Take by mouth Yes Brown Mares MD   Cholecalciferol (VITAMIN D) 50 MCG (2000 UT) CAPS capsule Take by mouth in the morning and at bedtime Yes Brown Mares MD   omeprazole (PRILOSEC) 40 MG delayed release capsule every other day Yes ProviderBrown MD       CareTeam (Including outside providers/suppliers regularly involved in providing care):   Patient Care Team:  Piper Veliz MD as PCP - General (Internal Medicine)  Piper Veliz MD as PCP - Empaneled Provider     Recommendations for Preventive Services Due: see orders and patient instructions/AVS.  Recommended screening schedule for the next 5-10 years is provided to the patient in written form: see Patient Instructions/AVS.     Reviewed and updated this visit:  Allergies  Meds  Problems  Sexual Hx                  The patient (or guardian, if applicable) and other individuals in attendance with the patient were advised that Artificial Intelligence will be utilized during this visit to record and process the conversation to generate a clinical note. The patient (or guardian, if applicable) and other individuals in attendance at the appointment consented to the use of AI, including the recording.

## 2025-06-18 ENCOUNTER — TELEPHONE (OUTPATIENT)
Dept: ADMINISTRATIVE | Age: 80
End: 2025-06-18

## 2025-06-18 ENCOUNTER — CARE COORDINATION (OUTPATIENT)
Dept: CASE MANAGEMENT | Age: 80
End: 2025-06-18

## 2025-06-18 LAB
ALBUMIN SERPL-MCNC: 4.3 G/DL (ref 3.4–5)
ALBUMIN/GLOB SERPL: 1.7 {RATIO} (ref 1.1–2.2)
ALP SERPL-CCNC: 41 U/L (ref 40–129)
ALT SERPL-CCNC: 18 U/L (ref 10–40)
ANION GAP SERPL CALCULATED.3IONS-SCNC: 12 MMOL/L (ref 3–16)
AST SERPL-CCNC: 19 U/L (ref 15–37)
BASOPHILS # BLD: 0.1 K/UL (ref 0–0.2)
BASOPHILS NFR BLD: 1.3 %
BILIRUB SERPL-MCNC: 0.6 MG/DL (ref 0–1)
BUN SERPL-MCNC: 24 MG/DL (ref 7–20)
CALCIUM SERPL-MCNC: 9.9 MG/DL (ref 8.3–10.6)
CHLORIDE SERPL-SCNC: 100 MMOL/L (ref 99–110)
CO2 SERPL-SCNC: 27 MMOL/L (ref 21–32)
CREAT SERPL-MCNC: 1.7 MG/DL (ref 0.8–1.3)
DEPRECATED RDW RBC AUTO: 13.9 % (ref 12.4–15.4)
EOSINOPHIL # BLD: 0.2 K/UL (ref 0–0.6)
EOSINOPHIL NFR BLD: 2.4 %
GFR SERPLBLD CREATININE-BSD FMLA CKD-EPI: 40 ML/MIN/{1.73_M2}
GLUCOSE SERPL-MCNC: 119 MG/DL (ref 70–99)
HCT VFR BLD AUTO: 42.9 % (ref 40.5–52.5)
HGB BLD-MCNC: 14.5 G/DL (ref 13.5–17.5)
LYMPHOCYTES # BLD: 1.5 K/UL (ref 1–5.1)
LYMPHOCYTES NFR BLD: 21.6 %
MCH RBC QN AUTO: 28.3 PG (ref 26–34)
MCHC RBC AUTO-ENTMCNC: 33.7 G/DL (ref 31–36)
MCV RBC AUTO: 83.7 FL (ref 80–100)
MONOCYTES # BLD: 0.6 K/UL (ref 0–1.3)
MONOCYTES NFR BLD: 9.4 %
NEUTROPHILS # BLD: 4.4 K/UL (ref 1.7–7.7)
NEUTROPHILS NFR BLD: 65.3 %
PLATELET # BLD AUTO: 336 K/UL (ref 135–450)
PMV BLD AUTO: 7.7 FL (ref 5–10.5)
POTASSIUM SERPL-SCNC: 3.9 MMOL/L (ref 3.5–5.1)
PROT SERPL-MCNC: 6.8 G/DL (ref 6.4–8.2)
RBC # BLD AUTO: 5.12 M/UL (ref 4.2–5.9)
SODIUM SERPL-SCNC: 139 MMOL/L (ref 136–145)
TSH SERPL DL<=0.005 MIU/L-ACNC: 4.45 UIU/ML (ref 0.27–4.2)
WBC # BLD AUTO: 6.8 K/UL (ref 4–11)

## 2025-06-18 NOTE — PROGRESS NOTES
[facility] PRE-SURGICAL TESTING INSTRUCTIONS                      PRIOR TO PROCEDURE DATE:    1. PLEASE FOLLOW ANY INSTRUCTIONS GIVEN TO YOU PER YOUR SURGEON.      2. Arrange for someone to drive you home and be with you for the first 24 hours after discharge for your safety after your procedure for which you received sedation. Ensure it is someone we can share information with regarding your discharge.     NOTE: At this time ONLY 2 ADULTS may accompany you   One person ENCOURAGED to stay at hospital entire time if outpatient surgery      3. You must contact your surgeon for instructions IF:  You are taking any blood thinners, aspirin, anti-inflammatory or vitamins.  There is a change in your physical condition such as a cold, fever, rash, cuts, sores, or any other infection, especially near your surgical site.    4. Do not drink alcohol the day before or day of your procedure.  Do not use any recreational marijuana at least 24 hours or street drugs (heroin, cocaine) at minimum 5 days prior to your procedure.     5. A Pre-Surgical History and Physical MUST be completed WITHIN 30 DAYS OR LESS prior to your procedure.by your Physician or an Urgent Care        THE DAY OF YOUR PROCEDURE:  1.  Follow instructions for ARRIVAL TIME as DIRECTED BY YOUR SURGEON.     2. Enter the MAIN entrance from [facility] and follow the signs to the free Parking Garage or  Parking (offered free of charge 7 am-5pm).      3. Enter the Main Entrance of the hospital (do not enter from the lower level of the parking garage). Upon entrance, check in with the  at the surgical information desk on your LEFT.   Bring your insurance card and photo ID to register      4. DO NOT EAT ANYTHING AFTER MIDNIGHT prior to arrival for surgery.    NOTE: ALL Gastric, Bariatric & Bowel surgery patients - you MUST follow your surgeon's instructions regarding eating/ drinking as you will have very specific instructions to follow.  If

## 2025-06-18 NOTE — CARE COORDINATION
Care Transitions Note    Final Call     Patient Current Location:  Home: [address] [place] [address]   [facility] [id number]    Care Transition Nurse contacted the patient by telephone. Verified name and  as identifiers.    Patient graduated from the Care Transitions program on 25.  Patient/family has the ability to self-manage at this time..      Advance Care Planning:   Does patient have an Advance Directive: reviewed during previous call, see note. .    Handoff:   Patient was not referred to the ACM team due to no additional needs identified.       Care Summary Note: Patient answered call and verified . Patient pleasant and agreeable to transition call. Patient was at [place] last week and discussed vacation. Patient had a great time. Patient scheduled for procedure on Monday and anticipate to be outpatient, but taking an overnight bag incase that changes. Taking all medication as directed. No further support calls needed and transition episode ended.   Denied any acute needs at present time.  Agreeable to f/u calls.  Educated on the use of urgent care or physician’s 24 hr access line if assistance is needed after hours.      Assessments:  Care Transitions Subsequent and Final Call    Subsequent and Final Calls  Care Transitions Interventions  Other Interventions:              Upcoming Appointments:    Future Appointments         Provider Specialty Dept Phone    2025 9:45 AM Kashmir Hernández MD Surgical Oncology [phone]    2025 1:30 PM Piper Veliz MD Primary Care [phone]    2025 12:30 PM Jan Dawson MD Nephrology [phone]    2025 10:30 AM Jose Contreras MD Cardiology [phone]            Patient has agreed to contact primary care provider and/or specialist for any further questions, concerns, or needs.    Adore Dill RN

## 2025-06-18 NOTE — TELEPHONE ENCOUNTER
Submitted PA for Ozempic (0.25 or 0.5 MG/DOSE) 2MG/3ML pen-injectors  Via CMM Key: [id number] STATUS: MESSAGE FROM PLAN .    There was an error with your request  Cannot find matching patient with Name and Date of Birth provided. Please make sure the patient's name and date of birth are spelled and formatted exactly as shown on the member's insurance card.     Called plan @ [phone]. Spoke with CHRISTINE Green started over the phone.       Case ID # [id number]  Clinical info faxed [phone]     PLEASE COMPLETE LAST OV TO SUBMIT PA REQUEST , PLAN IS REQUESTING CHART NOTES     If this requires a response please respond to the pool ( P MHCX PSC MEDICATION PRE-AUTH).      Thank you please advise patient.

## 2025-06-19 RX ORDER — ATORVASTATIN CALCIUM 80 MG/1
80 TABLET, FILM COATED ORAL DAILY
Qty: 90 TABLET | Refills: 2 | OUTPATIENT
Start: 2025-06-19

## 2025-06-20 ENCOUNTER — RESULTS FOLLOW-UP (OUTPATIENT)
Dept: PRIMARY CARE CLINIC | Age: 80
End: 2025-06-20

## 2025-06-20 PROBLEM — Z00.00 MEDICARE ANNUAL WELLNESS VISIT, SUBSEQUENT: Status: ACTIVE | Noted: 2025-06-20

## 2025-06-20 NOTE — TELEPHONE ENCOUNTER
Ov note completed    Faxed clinical info to [phone]       Follow up done daily; if no decision with in three days we will refax.  If another three days goes by with no decision will call the insurance for status.

## 2025-06-21 ENCOUNTER — ANESTHESIA EVENT (OUTPATIENT)
Dept: OPERATING ROOM | Age: 80
End: 2025-06-21
Payer: MEDICARE

## 2025-06-23 ENCOUNTER — ANESTHESIA (OUTPATIENT)
Dept: OPERATING ROOM | Age: 80
End: 2025-06-23
Payer: MEDICARE

## 2025-06-23 ENCOUNTER — HOSPITAL ENCOUNTER (OUTPATIENT)
Age: 80
Setting detail: OUTPATIENT SURGERY
Discharge: HOME | End: 2025-06-23
Attending: SURGERY | Admitting: SURGERY
Payer: MEDICARE

## 2025-06-23 VITALS
TEMPERATURE: 97.5 F | DIASTOLIC BLOOD PRESSURE: 80 MMHG | BODY MASS INDEX: 35.03 KG/M2 | RESPIRATION RATE: 18 BRPM | WEIGHT: 244.71 LBS | OXYGEN SATURATION: 94 % | SYSTOLIC BLOOD PRESSURE: 144 MMHG | HEIGHT: 70 IN | HEART RATE: 67 BPM

## 2025-06-23 DIAGNOSIS — Q44.6 CYSTIC DISEASE OF LIVER: ICD-10-CM

## 2025-06-23 LAB
ABO/RH: NORMAL
ANTIBODY SCREEN: NORMAL
APTT BLD: 27 SEC (ref 22.8–35.8)
GLUCOSE BLD-MCNC: 119 MG/DL (ref 70–99)
GLUCOSE BLD-MCNC: 129 MG/DL (ref 70–99)
INR PPP: 1.09 (ref 0.86–1.14)
PERFORMED ON: ABNORMAL
PERFORMED ON: ABNORMAL
PROTHROMBIN TIME: 14.4 SEC (ref 12.1–14.9)

## 2025-06-23 PROCEDURE — 86900 BLOOD TYPING SEROLOGIC ABO: CPT

## 2025-06-23 PROCEDURE — 3700000000 HC ANESTHESIA ATTENDED CARE: Performed by: SURGERY

## 2025-06-23 PROCEDURE — 49905 OMENTAL FLAP INTRA-ABDOM: CPT | Performed by: SURGERY

## 2025-06-23 PROCEDURE — 6360000002 HC RX W HCPCS: Performed by: NURSE PRACTITIONER

## 2025-06-23 PROCEDURE — 2500000003 HC RX 250 WO HCPCS: Performed by: SURGERY

## 2025-06-23 PROCEDURE — 2720000010 HC SURG SUPPLY STERILE: Performed by: SURGERY

## 2025-06-23 PROCEDURE — 3600000009 HC SURGERY ROBOT BASE: Performed by: SURGERY

## 2025-06-23 PROCEDURE — 2580000003 HC RX 258: Performed by: SURGERY

## 2025-06-23 PROCEDURE — 2500000003 HC RX 250 WO HCPCS

## 2025-06-23 PROCEDURE — 3700000001 HC ADD 15 MINUTES (ANESTHESIA): Performed by: SURGERY

## 2025-06-23 PROCEDURE — 2580000003 HC RX 258: Performed by: ANESTHESIOLOGY

## 2025-06-23 PROCEDURE — 7100000001 HC PACU RECOVERY - ADDTL 15 MIN: Performed by: SURGERY

## 2025-06-23 PROCEDURE — 6360000002 HC RX W HCPCS

## 2025-06-23 PROCEDURE — 2500000003 HC RX 250 WO HCPCS: Performed by: NURSE PRACTITIONER

## 2025-06-23 PROCEDURE — 6360000002 HC RX W HCPCS: Performed by: ANESTHESIOLOGY

## 2025-06-23 PROCEDURE — 3600000019 HC SURGERY ROBOT ADDTL 15MIN: Performed by: SURGERY

## 2025-06-23 PROCEDURE — 2709999900 HC NON-CHARGEABLE SUPPLY: Performed by: SURGERY

## 2025-06-23 PROCEDURE — 47379 UNLISTED LAPS PX LIVER: CPT | Performed by: SURGERY

## 2025-06-23 PROCEDURE — 6370000000 HC RX 637 (ALT 250 FOR IP): Performed by: ANESTHESIOLOGY

## 2025-06-23 PROCEDURE — 85730 THROMBOPLASTIN TIME PARTIAL: CPT

## 2025-06-23 PROCEDURE — 7100000010 HC PHASE II RECOVERY - FIRST 15 MIN: Performed by: SURGERY

## 2025-06-23 PROCEDURE — 86901 BLOOD TYPING SEROLOGIC RH(D): CPT

## 2025-06-23 PROCEDURE — 86850 RBC ANTIBODY SCREEN: CPT

## 2025-06-23 PROCEDURE — 7100000011 HC PHASE II RECOVERY - ADDTL 15 MIN: Performed by: SURGERY

## 2025-06-23 PROCEDURE — S2900 ROBOTIC SURGICAL SYSTEM: HCPCS | Performed by: SURGERY

## 2025-06-23 PROCEDURE — 7100000000 HC PACU RECOVERY - FIRST 15 MIN: Performed by: SURGERY

## 2025-06-23 PROCEDURE — 47010 HEPATOT OPN DRG ABSC/CST 1/2: CPT | Performed by: SURGERY

## 2025-06-23 PROCEDURE — 85610 PROTHROMBIN TIME: CPT

## 2025-06-23 PROCEDURE — 88305 TISSUE EXAM BY PATHOLOGIST: CPT

## 2025-06-23 RX ORDER — OXYCODONE HYDROCHLORIDE 5 MG/1
5 TABLET ORAL EVERY 6 HOURS PRN
Qty: 20 TABLET | Refills: 0 | Status: SHIPPED | OUTPATIENT
Start: 2025-06-23 | End: 2025-06-28

## 2025-06-23 RX ORDER — HYDROMORPHONE HYDROCHLORIDE 1 MG/ML
0.5 INJECTION, SOLUTION INTRAMUSCULAR; INTRAVENOUS; SUBCUTANEOUS EVERY 5 MIN PRN
Status: DISCONTINUED | OUTPATIENT
Start: 2025-06-23 | End: 2025-06-23 | Stop reason: HOSPADM

## 2025-06-23 RX ORDER — INDOCYANINE GREEN AND WATER 25 MG
5 KIT INJECTION
Qty: 2 ML | Refills: 0
Start: 2025-06-23 | End: 2025-06-23

## 2025-06-23 RX ORDER — ONDANSETRON 2 MG/ML
4 INJECTION INTRAMUSCULAR; INTRAVENOUS
Status: DISCONTINUED | OUTPATIENT
Start: 2025-06-23 | End: 2025-06-23 | Stop reason: HOSPADM

## 2025-06-23 RX ORDER — PROPOFOL 10 MG/ML
INJECTION, EMULSION INTRAVENOUS
Status: DISCONTINUED | OUTPATIENT
Start: 2025-06-23 | End: 2025-06-23 | Stop reason: SDUPTHER

## 2025-06-23 RX ORDER — IPRATROPIUM BROMIDE AND ALBUTEROL SULFATE 2.5; .5 MG/3ML; MG/3ML
1 SOLUTION RESPIRATORY (INHALATION)
Status: DISCONTINUED | OUTPATIENT
Start: 2025-06-23 | End: 2025-06-23 | Stop reason: HOSPADM

## 2025-06-23 RX ORDER — ROCURONIUM BROMIDE 10 MG/ML
INJECTION, SOLUTION INTRAVENOUS
Status: DISCONTINUED | OUTPATIENT
Start: 2025-06-23 | End: 2025-06-23 | Stop reason: SDUPTHER

## 2025-06-23 RX ORDER — LIDOCAINE HYDROCHLORIDE 20 MG/ML
INJECTION, SOLUTION INTRAVENOUS
Status: DISCONTINUED | OUTPATIENT
Start: 2025-06-23 | End: 2025-06-23 | Stop reason: SDUPTHER

## 2025-06-23 RX ORDER — PROCHLORPERAZINE EDISYLATE 5 MG/ML
5 INJECTION INTRAMUSCULAR; INTRAVENOUS
Status: DISCONTINUED | OUTPATIENT
Start: 2025-06-23 | End: 2025-06-23 | Stop reason: HOSPADM

## 2025-06-23 RX ORDER — SODIUM CHLORIDE 0.9 % (FLUSH) 0.9 %
5-40 SYRINGE (ML) INJECTION EVERY 12 HOURS SCHEDULED
Status: DISCONTINUED | OUTPATIENT
Start: 2025-06-23 | End: 2025-06-23 | Stop reason: HOSPADM

## 2025-06-23 RX ORDER — DOCUSATE SODIUM 100 MG/1
100 CAPSULE, LIQUID FILLED ORAL 2 TIMES DAILY
Qty: 30 CAPSULE | Refills: 0 | Status: SHIPPED | OUTPATIENT
Start: 2025-06-23

## 2025-06-23 RX ORDER — DEXAMETHASONE SODIUM PHOSPHATE 4 MG/ML
INJECTION, SOLUTION INTRA-ARTICULAR; INTRALESIONAL; INTRAMUSCULAR; INTRAVENOUS; SOFT TISSUE
Status: DISCONTINUED | OUTPATIENT
Start: 2025-06-23 | End: 2025-06-23 | Stop reason: SDUPTHER

## 2025-06-23 RX ORDER — OXYCODONE HYDROCHLORIDE 5 MG/1
5 TABLET ORAL
Status: COMPLETED | OUTPATIENT
Start: 2025-06-23 | End: 2025-06-23

## 2025-06-23 RX ORDER — BUPIVACAINE HYDROCHLORIDE AND EPINEPHRINE 5; 5 MG/ML; UG/ML
INJECTION, SOLUTION EPIDURAL; INTRACAUDAL; PERINEURAL PRN
Status: DISCONTINUED | OUTPATIENT
Start: 2025-06-23 | End: 2025-06-23 | Stop reason: HOSPADM

## 2025-06-23 RX ORDER — GLYCOPYRROLATE 0.2 MG/ML
INJECTION INTRAMUSCULAR; INTRAVENOUS
Status: DISCONTINUED | OUTPATIENT
Start: 2025-06-23 | End: 2025-06-23 | Stop reason: SDUPTHER

## 2025-06-23 RX ORDER — FENTANYL CITRATE 50 UG/ML
INJECTION, SOLUTION INTRAMUSCULAR; INTRAVENOUS
Status: DISCONTINUED | OUTPATIENT
Start: 2025-06-23 | End: 2025-06-23 | Stop reason: SDUPTHER

## 2025-06-23 RX ORDER — SODIUM CHLORIDE, SODIUM LACTATE, POTASSIUM CHLORIDE, CALCIUM CHLORIDE 600; 310; 30; 20 MG/100ML; MG/100ML; MG/100ML; MG/100ML
INJECTION, SOLUTION INTRAVENOUS CONTINUOUS
Status: DISCONTINUED | OUTPATIENT
Start: 2025-06-23 | End: 2025-06-23 | Stop reason: HOSPADM

## 2025-06-23 RX ORDER — LABETALOL HYDROCHLORIDE 5 MG/ML
10 INJECTION, SOLUTION INTRAVENOUS
Status: DISCONTINUED | OUTPATIENT
Start: 2025-06-23 | End: 2025-06-23 | Stop reason: HOSPADM

## 2025-06-23 RX ORDER — SODIUM CHLORIDE 0.9 % (FLUSH) 0.9 %
5-40 SYRINGE (ML) INJECTION PRN
Status: DISCONTINUED | OUTPATIENT
Start: 2025-06-23 | End: 2025-06-23 | Stop reason: HOSPADM

## 2025-06-23 RX ORDER — ONDANSETRON 2 MG/ML
INJECTION INTRAMUSCULAR; INTRAVENOUS
Status: DISCONTINUED | OUTPATIENT
Start: 2025-06-23 | End: 2025-06-23 | Stop reason: SDUPTHER

## 2025-06-23 RX ORDER — DIPHENHYDRAMINE HYDROCHLORIDE 50 MG/ML
12.5 INJECTION, SOLUTION INTRAMUSCULAR; INTRAVENOUS
Status: DISCONTINUED | OUTPATIENT
Start: 2025-06-23 | End: 2025-06-23 | Stop reason: HOSPADM

## 2025-06-23 RX ORDER — PHENYLEPHRINE HCL IN 0.9% NACL 1 MG/10 ML
SYRINGE (ML) INTRAVENOUS
Status: DISCONTINUED | OUTPATIENT
Start: 2025-06-23 | End: 2025-06-23

## 2025-06-23 RX ORDER — LIDOCAINE HYDROCHLORIDE 10 MG/ML
1 INJECTION, SOLUTION EPIDURAL; INFILTRATION; INTRACAUDAL; PERINEURAL
Status: DISCONTINUED | OUTPATIENT
Start: 2025-06-23 | End: 2025-06-23 | Stop reason: HOSPADM

## 2025-06-23 RX ORDER — FENTANYL CITRATE 50 UG/ML
25 INJECTION, SOLUTION INTRAMUSCULAR; INTRAVENOUS EVERY 5 MIN PRN
Status: DISCONTINUED | OUTPATIENT
Start: 2025-06-23 | End: 2025-06-23 | Stop reason: HOSPADM

## 2025-06-23 RX ORDER — SODIUM CHLORIDE 9 MG/ML
INJECTION, SOLUTION INTRAVENOUS PRN
Status: DISCONTINUED | OUTPATIENT
Start: 2025-06-23 | End: 2025-06-23 | Stop reason: HOSPADM

## 2025-06-23 RX ADMIN — ROCURONIUM BROMIDE 10 MG: 10 INJECTION, SOLUTION INTRAVENOUS at 10:26

## 2025-06-23 RX ADMIN — FENTANYL CITRATE 25 MCG: 50 INJECTION, SOLUTION INTRAMUSCULAR; INTRAVENOUS at 12:10

## 2025-06-23 RX ADMIN — PROPOFOL 120 MG: 10 INJECTION, EMULSION INTRAVENOUS at 10:04

## 2025-06-23 RX ADMIN — ROCURONIUM BROMIDE 70 MG: 10 INJECTION, SOLUTION INTRAVENOUS at 10:05

## 2025-06-23 RX ADMIN — LABETALOL HYDROCHLORIDE 10 MG: 5 INJECTION, SOLUTION INTRAVENOUS at 15:24

## 2025-06-23 RX ADMIN — HYDROMORPHONE HYDROCHLORIDE 0.5 MG: 1 INJECTION, SOLUTION INTRAMUSCULAR; INTRAVENOUS; SUBCUTANEOUS at 11:14

## 2025-06-23 RX ADMIN — FENTANYL CITRATE 25 MCG: 50 INJECTION, SOLUTION INTRAMUSCULAR; INTRAVENOUS at 12:13

## 2025-06-23 RX ADMIN — SODIUM CHLORIDE, SODIUM LACTATE, POTASSIUM CHLORIDE, AND CALCIUM CHLORIDE: .6; .31; .03; .02 INJECTION, SOLUTION INTRAVENOUS at 08:37

## 2025-06-23 RX ADMIN — WATER 2000 MG: 1 INJECTION INTRAMUSCULAR; INTRAVENOUS; SUBCUTANEOUS at 10:18

## 2025-06-23 RX ADMIN — SUGAMMADEX 400 MG: 100 INJECTION, SOLUTION INTRAVENOUS at 11:46

## 2025-06-23 RX ADMIN — HYDROMORPHONE HYDROCHLORIDE 0.5 MG: 1 INJECTION, SOLUTION INTRAMUSCULAR; INTRAVENOUS; SUBCUTANEOUS at 09:58

## 2025-06-23 RX ADMIN — FENTANYL CITRATE 25 MCG: 50 INJECTION, SOLUTION INTRAMUSCULAR; INTRAVENOUS at 12:08

## 2025-06-23 RX ADMIN — SODIUM CHLORIDE, SODIUM LACTATE, POTASSIUM CHLORIDE, AND CALCIUM CHLORIDE: .6; .31; .03; .02 INJECTION, SOLUTION INTRAVENOUS at 08:47

## 2025-06-23 RX ADMIN — LIDOCAINE HYDROCHLORIDE 100 MG: 20 INJECTION, SOLUTION INTRAVENOUS at 10:04

## 2025-06-23 RX ADMIN — ROCURONIUM BROMIDE 10 MG: 10 INJECTION, SOLUTION INTRAVENOUS at 11:25

## 2025-06-23 RX ADMIN — DEXAMETHASONE SODIUM PHOSPHATE 8 MG: 4 INJECTION INTRA-ARTICULAR; INTRALESIONAL; INTRAMUSCULAR; INTRAVENOUS; SOFT TISSUE at 10:17

## 2025-06-23 RX ADMIN — OXYCODONE 5 MG: 5 TABLET ORAL at 15:11

## 2025-06-23 RX ADMIN — GLYCOPYRROLATE 0.2 MG: 0.2 INJECTION INTRAMUSCULAR; INTRAVENOUS at 10:58

## 2025-06-23 RX ADMIN — ROCURONIUM BROMIDE 10 MG: 10 INJECTION, SOLUTION INTRAVENOUS at 10:33

## 2025-06-23 RX ADMIN — ONDANSETRON 4 MG: 2 INJECTION, SOLUTION INTRAMUSCULAR; INTRAVENOUS at 10:17

## 2025-06-23 ASSESSMENT — PAIN SCALES - GENERAL
PAINLEVEL_OUTOF10: 0
PAINLEVEL_OUTOF10: 5
PAINLEVEL_OUTOF10: 0
PAINLEVEL_OUTOF10: 0

## 2025-06-23 ASSESSMENT — PAIN DESCRIPTION - ORIENTATION: ORIENTATION: MID

## 2025-06-23 ASSESSMENT — PAIN - FUNCTIONAL ASSESSMENT
PAIN_FUNCTIONAL_ASSESSMENT: NONE - DENIES PAIN
PAIN_FUNCTIONAL_ASSESSMENT: 0-10

## 2025-06-23 ASSESSMENT — PAIN DESCRIPTION - LOCATION: LOCATION: ABDOMEN

## 2025-06-23 ASSESSMENT — PAIN DESCRIPTION - DESCRIPTORS: DESCRIPTORS: ACHING

## 2025-06-23 NOTE — ANESTHESIA POSTPROCEDURE EVALUATION
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    Hiram Phase I: Hiram Score: 8    Hiram Phase II:      Anesthesia Post Evaluation    Patient location during evaluation: PACU  Patient participation: complete - patient participated  Level of consciousness: awake  Airway patency: patent  Nausea & Vomiting: no nausea and no vomiting  Cardiovascular status: blood pressure returned to baseline and hemodynamically stable  Respiratory status: acceptable  Hydration status: euvolemic  Multimodal analgesia pain management approach  Pain management: adequate    No notable events documented.

## 2025-06-23 NOTE — PROGRESS NOTES
PACU Transfer Note    Vitals:    06/23/25 1600   BP: (!) 143/87   Pulse: 72   Resp:    Temp:    SpO2: 94%       In: 2000 [P.O.:500; I.V.:1500]  Out: 450 [Urine:450]    Pain assessment:  none Patient to [facility] bed for discharge- family at bedside.   Pain Level: 0    Report given to Receiving unit RN.    6/23/2025 4:16 PM

## 2025-06-23 NOTE — CONSULTS
Urology Attending Consult Note      Reason for Consultation: urethral bleeding    History:   Jimmy Hubbard Is a 80 y.o. male undergoing a laparoscopic liver cyst excision. During the case, a Jordan was placed with minimal drainage. The Jordan was removed at the end of the case and there was a large amount of urethral bleeding. Urology consulted for assistance. Pt with reported Hx of BPH, he is not on meds currently.     Past Medical History:   Diagnosis Date    Atrial fibrillation (HCC)     BPH (benign prostatic hyperplasia)     CAD (coronary artery disease)     Hyperlipidemia     Hypertension     Thyroid disease     Type 2 diabetes mellitus (HCC)        Past Surgical History:   Procedure Laterality Date    CARDIAC SURGERY  07/2022    CABG    CHOLECYSTECTOMY      ELBOW SURGERY Right     GALLBLADDER SURGERY  2023    JOINT REPLACEMENT      KNEE ARTHROPLASTY      NECK SURGERY      WRIST SURGERY Bilateral        Family History   Family history unknown: Yes       Social History     Socioeconomic History    Marital status: Single     Spouse name: Not on file    Number of children: Not on file    Years of education: Not on file    Highest education level: Not on file   Occupational History    Not on file   Tobacco Use    Smoking status: Never     Passive exposure: Never    Smokeless tobacco: Never   Vaping Use    Vaping status: Never Used   Substance and Sexual Activity    Alcohol use: Never    Drug use: Never    Sexual activity: Yes     Partners: Female   Other Topics Concern    Not on file   Social History Narrative    Not on file     Social Drivers of Health     Financial Resource Strain: Low Risk  (12/14/2023)    Overall Financial Resource Strain (CARDIA)     Difficulty of Paying Living Expenses: Not hard at all   Food Insecurity: No Food Insecurity (5/19/2025)    Hunger Vital Sign     Worried About Running Out of Food in the Last Year: Never true     Ran Out of Food in the Last Year: Never true  05/21/2025 08:36 AM     06/17/2025 04:23 PM    CO2 27 06/17/2025 04:23 PM    BUN 24 06/17/2025 04:23 PM    CREATININE 1.7 06/17/2025 04:23 PM    CALCIUM 9.9 06/17/2025 04:23 PM    LABGLOM 40 06/17/2025 04:23 PM    LABGLOM 56 04/30/2024 10:04 AM     PT/INR:    Lab Results   Component Value Date/Time    PROTIME 14.4 06/23/2025 08:55 AM    INR 1.09 06/23/2025 08:55 AM     PTT:    Lab Results   Component Value Date/Time    APTT 27.0 06/23/2025 08:55 AM   [APTT    Urinalysis:   Lab Results   Component Value Date/Time    COLORU Yellow 07/11/2024 04:44 PM    NITRU Negative 07/11/2024 04:44 PM    GLUCOSEU Negative 07/11/2024 04:44 PM    KETUA Negative 07/11/2024 04:44 PM    UROBILINOGEN 0.2 07/11/2024 04:44 PM    BILIRUBINUR Negative 07/11/2024 04:44 PM        Imaging:   MRI ABDOMEN W WO CONTRAST  Narrative: HISTORY: Epigastric pain. Intermittent abdominal pain.    MRI of the abdomen with and without contrast.    COMPARISON: CT of the abdomen pelvis with contrast 5/19/2025.    TECHNIQUE: Multiplanar T1 and T2-weighted images were obtained of the abdomen  with and without contrast. Postcontrast images were obtained after the patient  was given 10 mL of Eovist.    FINDINGS:  Out of phase T1 imaging demonstrates significant drop in signal in the liver  compatible with hepatic steatosis.    There is a large complex cystic lesion adjacent to the left lobe of the liver  which appears to be arising from the posterior aspect of the lateral segment  left lobe of the liver. This demonstrates low T2, high T1 signal in the  dependent portion of the cyst most consistent with a large hemorrhagic  component. No appreciable enhancement on postcontrast imaging within the cyst.  There is minimal thin walled enhancement. The hemorrhagic cystic lesion measures  approximately 8.4 cm in craniocaudal dimension, 8.7 cm in lateral dimension, and  8.2 cm in anterior to posterior dimension.    There is an immediately adjacent benign

## 2025-06-23 NOTE — ANESTHESIA PRE PROCEDURE
Department of Anesthesiology  Preprocedure Note       Name:  Jimmy Hubbard   Age:  80 y.o.  :  1945                                          MRN:  [id number]         Date:  2025      Surgeon: Surgeon(s):  Kashmir Hernández MD    Procedure: Procedure(s):  Robotic / Laparoscopic liver cyst excision    Medications prior to admission:   Prior to Admission medications    Medication Sig Start Date End Date Taking? Authorizing Provider   Semaglutide,0.25 or 0.5MG/DOS, 2 MG/3ML SOPN Inject 0.25 mg into the skin every 7 days 25   Piper Veliz MD   atorvastatin (LIPITOR) 80 MG tablet Take 1 tablet by mouth daily 25   Jose Contreras MD   fenofibrate 160 MG tablet Take 1 tablet by mouth daily 25   Jose Contreras MD   ELIQUIS 5 MG TABS tablet TAKE 1 TABLET BY MOUTH TWICE  DAILY 25   Momo Franklin MD   sildenafil (VIAGRA) 100 MG tablet Take 0.5-1 tablets by mouth daily as needed 25   Brown Mares MD   levothyroxine (SYNTHROID) 50 MCG tablet Take 1 tablet by mouth Daily 25   Piper Veliz MD   losartan (COZAAR) 50 MG tablet Take 0.5 tablets by mouth daily 25   Jan Dawson MD   hydroCHLOROthiazide (HYDRODIURIL) 25 MG tablet Take 1 tablet by mouth every morning 25   Jan Dawson MD   amLODIPine (NORVASC) 10 MG tablet Take 1 tablet by mouth daily 24   Jan Dawson MD   zinc 50 MG TABS tablet Take by mouth    Brown Mares MD   Cholecalciferol (VITAMIN D) 50 MCG (2000) CAPS capsule Take by mouth in the morning and at bedtime    Brown Mares MD   omeprazole (PRILOSEC) 40 MG delayed release capsule every other day 23   Brown Mares MD       Current medications:    Current Facility-Administered Medications   Medication Dose Route Frequency Provider Last Rate Last Admin   • lidocaine PF 1 % injection 1 mL  1 mL IntraDERmal Once PRN Harsha Toro DO       • lactated ringers infusion   IntraVENous

## 2025-06-23 NOTE — PROCEDURES
PROCEDURE NOTE  Date: 6/23/2025   Name: Jimmy Hubbard  YOB: 1945    Jordan placement    Genitals prepped in sterile fashion. An 18 Fr coude catheter was placed with mild resistance but nothing of concern. Return of anup-pink urine. Balloon inflated with 10 mL of sterile water.    See consult note for plan    Harsha Amos MD  Urology

## 2025-06-23 NOTE — INTERVAL H&P NOTE
Update History & Physical    The patient's History and Physical of June 17, 2025 was reviewed with the patient and I examined the patient. There was no change. The surgical site was confirmed by the patient and me.     Plan: The risks, benefits, expected outcome, and alternative to the recommended procedure have been discussed with the patient. Patient understands and wants to proceed with the procedure.     Electronically signed by Roger Ruiz DO on 6/23/2025 at 9:04 AM

## 2025-06-23 NOTE — PROGRESS NOTES
RN removed carey at 11:47am, Bleeding was present after balloon was let down. 10cc of sterile water was removed from balloon. Dr. Hernández notified, and Dr. Amos was consulted. Dr. Amos check site and placed 18F coude catheter. Further orders given to PACU RN.

## 2025-06-23 NOTE — PROGRESS NOTES
Ambulatory Surgery/Procedure Discharge Note    Vitals:    06/23/25 1630   BP: (!) 144/80   Pulse: 67   Resp: 18   Temp: 97.5 °F (36.4 °C)   SpO2: 94%     Pt meets discharge criteria per Hiram score.   In: 2000 [P.O.:500; I.V.:1500]  Out: 450 [Urine:450] Pt voided clear yellow urine with a small amount of red noted. Denies pain with urination.     Restroom use offered before discharge.  Yes    Pain assessment:  none  Pain Level: 0 Only with urination and coughing    Pt and S.O./family states \"ready to go home\". Pt alert and oriented x4. IV removed. Denies N/V or pain. 4 laparascopic abdominal incisions well approximated with surgical glue. C/d/I.  Voided prior to discharge. Pt tolerating po intake. Discharge instructions given to pt and gf with pt permission. Pt and gf verbalized understanding of all instructions. Left with all belongings, 2 prescriptions, and discharge instructions.             Patient discharged to home/self care. Patient discharged via wheel chair by transporter to waiting family/S.O.       6/23/2025 4:47 PM

## 2025-06-23 NOTE — PROGRESS NOTES
Patient voided 100 ml pink tinged urine- no clots seen.   Patient complains of burning with urination.   Bladder scanned after voiding- no residual.

## 2025-06-23 NOTE — PROGRESS NOTES
From OR to PACU.   Post op Procedures  Robotic / Laparoscopic liver cyst excision  Kashmir Hernández MD  In PACU, OR 04  Report received from CRNA at bedside and surgical resident Joseph.   Patient drowsy post op- no needs at this time.   Abdomen with 4 scope sites dermabond.   Jordan coude in place- to be removed at 1415, and patient must void prior to discharge home per .     Post op blood sugar 128.

## 2025-06-23 NOTE — DISCHARGE INSTRUCTIONS
Discharge Instructions:    Hold anticoagulation post operatively. Can restart Eliquis on morning of 6/26/2025    Diet:   You may resume a regular diet.    Wound Care:   Skin glue was used to cover your incision(s). It will fall off on its own in about 10 days. You may shower, but do not scrub the incision sites directly or soak (tub, pool, etc.).    Activity:   No heavy lifting greater than a milk jug until follow up.    Pain management:   Unless informed of any restrictions by your primary care physician, please use your preferred over-the-counter pain reliever as your primary pain medication. If you have pain that persists despite over-the-counter pain medications, you have been provided with a prescription for an opioid/narcotic pain reliever.  No driving or operating machinery while taking opioid/narcotic medications.     Bowel Regimen:   Opioid/Narcotic pain relievers have a common side effect of constipation; therefore, you have been provided with a prescription for a stool softener, Docusate (Colace).  These medications are intended to help prevent you from experiencing this very common side effect and also help to regulate your bowels after surgery.   If your stools become too loose and/or frequent, decrease the Colace to one pill one time each day. If your stools are still loose after this modification, stop taking this medication all together.    Return Precautions:   Call/ Return to ED for increased redness, worsening pain, drainage from wound, fevers, or any other concerns about your incision or post op course.      Follow up with Dr. Hernández in 1-2 weeks. Please call [phone] to schedule your appointment.    [facility] AMBULATORY PROCEDURE DISCHARGE INSTRUCTIONS    There are potential side effects of anesthesia or sedation you may experience for the first 24 hours.  These side effects include:    Confusion or Memory loss, Dizziness, or Delayed Reaction Times   [x]A responsible person  of how many pills you have left    Unused medication can be disposed of by taking them to a drop-off box or take-back program that is authorized by the [employer].  Access to a site near you can be found on the [employer]'s [employer] website ([employer]iversion[employer]gov).    If you have a CPAP machine, it is very important that you use it daily during all periods of sleep and daytime rest during your recovery at home.  Surgery and Anesthesia place a significant amount of stress on your body.  Using your CPAP will help keep you safe and lessen the negative effects of that stress.    FOLLOW-UP RECOVERY CARE:  [x]Call the office  for follow-up appointment and problems    Watch for these possible complications, symptoms, or side effects of anesthesia.  Call physician if they or any other problems occur:  Signs of INFECTION   > Fever over 101°     > Redness, swelling, hardness or warmth at the operative site   >Foul smelling or cloudy drainage at the operative site   Unrelieved PAIN  Unrelieved NAUSEA  Blood soaked dressing.  (Some oozing may be normal)  Inability to urinate      Numb, pale, blue, cold or tingling extremity      Physician:  Dr. Hernández    The above instructions were reviewed with patient/significant other.  The following additional patient specific information was reviewed with the patient/significant other:  []Procedure/physician specific instructions  []Medication information sheet(S) including potential side effects      I have read and understand the instructions given to me: ____________________________________________   (Patient/S.O. Signature)            Date/time 6/23/2025 4:14 PM                 If you smoke STOP. We care about your health!

## 2025-06-23 NOTE — PROGRESS NOTES
Coude carey discontinued without difficulty  Emptied 350 ml urine from carey bag.   Patient had some bloody discharge, no clots seen.

## 2025-06-24 ENCOUNTER — TELEPHONE (OUTPATIENT)
Dept: SURGERY | Age: 80
End: 2025-06-24

## 2025-06-24 NOTE — TELEPHONE ENCOUNTER
Jimmy called in to inquire about follow-up appt.     States he is doing well overall. Having some soreness but pain is controlled. Tolerating diet. No concerns with incisions. Voiding without difficulty. Has not had bowel movement yet.     Discussed monitoring for changes- specifically fevers/chills, increasing pain, changes to appearance of incisions, intolerance of diet/nausea/vomiting, constipation. Encouraged him to stay hydrated and use stool softener. Verbalized understanding.     Instructed to call with any questions or concerns. Verbalized understanding.

## 2025-06-26 ENCOUNTER — RESULTS FOLLOW-UP (OUTPATIENT)
Dept: SURGERY | Age: 80
End: 2025-06-26

## 2025-06-30 NOTE — TELEPHONE ENCOUNTER
Called plan and spoke with Vidal. Reports this case was approved. He is faxing approval information for the chart.

## 2025-07-01 NOTE — TELEPHONE ENCOUNTER
Airway  Date/Time: 3/14/2025 10:58 AM  Urgency: elective    Airway not difficult    Staffing  Performed: CRNA   Authorized by: Chino Freeman MD    Performed by: JADEN Duarte-CRNA  Patient location during procedure: OR    Indications and Patient Condition  Indications for airway management: anesthesia  Spontaneous ventilation: present  Sedation level: deep  Preoxygenated: yes  Patient position: sniffing  Mask difficulty assessment: 0 - not attempted    Final Airway Details  Final airway type: supraglottic airway      Successful airway: classic  Size 4     Number of attempts at approach: 1  Ventilation between attempts: none  Number of other approaches attempted: 0           The medication Ozempic (0.25 or 0.5 MG/DOSE) 2MG/3ML pen-injectors  is APPROVED THROUGH 12/31/2025.    Please notify the patient.    If this requires a response please respond to the pool ( P MHCX PSC MEDICATION PRE-AUTH).

## 2025-07-01 NOTE — TELEPHONE ENCOUNTER
Patient informed of approval of medication. Patient will need refill sent to pharmacy in two weeks. Patient is going on a 10 day cruise to [place] on 7/22

## 2025-07-02 NOTE — PROGRESS NOTES
[facility]  [facility] , [address]  [facility] [address]  Phone: [phone]  Fax: [phone]      Visit Date: 7/8/2025    Subjective:   Jimmy Hubbard is a 80 y.o. male here for postoperative visit after his robotic liver cyst excision 6/23/25 with Dr. Hernández. He is doing well overall- denies pain, states he is tolerating diet and having bowel movements. No concerns with incision sites.     Objective:     BP (!) 156/90 (BP Site: Right Upper Arm)   Pulse 65   Temp 97.5 °F (36.4 °C) (Temporal)   Ht 1.778 m (5' 10\")   Wt 112.5 kg (248 lb)   BMI 35.58 kg/m²     General:  Alert, oriented x 3, cooperative.    Skin: Skin color, texture, turgor normal.    Lymph nodes: Cervical, supraclavicular, and axillary nodes normal.   HENT:  Normocephalic, without obvious abnormality. Moist mucus membranes. No icterus.   Lungs: No respiratory distress.   Abdomen: Soft, non-tender. No masses,  No organomegaly. Lap sites well-healed. Minimal ecchymosis over left medial lap site. No erythema or drainage.      Pathology Reviewed:   6/23 Surgical Pathology   Liver cyst, excision:   Cyst wall lined by biliary epithelium, consistent with simple biliary hepatic cyst.    Negative for malignancy.    Assessment/Plan:      Diagnosis Orders   1. Cystic disease of liver        2. Liver cyst        3. Encounter for post surgical wound check        Doing well with no post-operative complications.     Reviewed ways to reduce risk of hernia development including avoiding heavy lifting, avoiding constipation, and wearing supportive device/clothing/splinting with coughing/laughter.     Pathology reviewed- copy provided.     Follow up PRN for new or worsening problems.     Shayy Mcdaniel DNP, APRN-CNP  Surgical Oncology Nurse Practitioner  7/8/2025  10:38 AM

## 2025-07-08 ENCOUNTER — OFFICE VISIT (OUTPATIENT)
Dept: SURGERY | Age: 80
End: 2025-07-08

## 2025-07-08 VITALS
SYSTOLIC BLOOD PRESSURE: 156 MMHG | WEIGHT: 248 LBS | HEART RATE: 65 BPM | BODY MASS INDEX: 35.5 KG/M2 | HEIGHT: 70 IN | TEMPERATURE: 97.5 F | DIASTOLIC BLOOD PRESSURE: 90 MMHG

## 2025-07-08 DIAGNOSIS — Z48.89 ENCOUNTER FOR POST SURGICAL WOUND CHECK: ICD-10-CM

## 2025-07-08 DIAGNOSIS — K76.89 LIVER CYST: ICD-10-CM

## 2025-07-08 DIAGNOSIS — Q44.6 CYSTIC DISEASE OF LIVER: Primary | ICD-10-CM

## 2025-07-08 PROCEDURE — 99024 POSTOP FOLLOW-UP VISIT: CPT | Performed by: NURSE PRACTITIONER

## 2025-07-14 ENCOUNTER — PATIENT MESSAGE (OUTPATIENT)
Dept: PRIMARY CARE CLINIC | Age: 80
End: 2025-07-14

## 2025-07-14 DIAGNOSIS — E11.22 TYPE 2 DIABETES MELLITUS WITH STAGE 3A CHRONIC KIDNEY DISEASE, WITHOUT LONG-TERM CURRENT USE OF INSULIN (HCC): ICD-10-CM

## 2025-07-14 DIAGNOSIS — N18.31 TYPE 2 DIABETES MELLITUS WITH STAGE 3A CHRONIC KIDNEY DISEASE, WITHOUT LONG-TERM CURRENT USE OF INSULIN (HCC): ICD-10-CM

## 2025-07-14 NOTE — TELEPHONE ENCOUNTER
Medication:   Requested Prescriptions     Pending Prescriptions Disp Refills    Semaglutide,0.25 or 0.5MG/DOS, 2 MG/3ML SOPN 3 mL 0     Sig: Inject 0.25 mg into the skin every 7 days     Last Filled:  6/17/25    Last appt: 6/17/2025   Next appt: 8/18/2025    Last Labs DM:   Lab Results   Component Value Date/Time    LABA1C 7.7 05/14/2025 07:48 AM

## 2025-07-15 RX ORDER — HYDROCHLOROTHIAZIDE 25 MG/1
25 TABLET ORAL EVERY MORNING
Qty: 90 TABLET | Refills: 1 | Status: SHIPPED | OUTPATIENT
Start: 2025-07-15

## 2025-07-15 RX ORDER — SEMAGLUTIDE 0.68 MG/ML
0.5 INJECTION, SOLUTION SUBCUTANEOUS WEEKLY
Qty: 2 ML | Refills: 1 | Status: SHIPPED | OUTPATIENT
Start: 2025-07-15

## 2025-07-15 NOTE — TELEPHONE ENCOUNTER
Increased dose of Ozempic to 0.5 mg subcutaneously which is the next dose and sent a new prescription to [facility] pharmacy. Sent patient a [employer] message to inform him.

## 2025-07-15 NOTE — TELEPHONE ENCOUNTER
Refill Request     Last Seen: Visit date not found    Last Written: 01/07/2025    Next Appointment:   Future Appointments   Date Time Provider Department Center   8/18/2025  1:30 PM Piper Veliz MD [facility] DEP   11/17/2025 12:30 PM Jan Dawson MD [facility]   11/20/2025 10:30 AM Jose Contreras MD [facility]             Requested Prescriptions     Pending Prescriptions Disp Refills    hydroCHLOROthiazide (HYDRODIURIL) 25 MG tablet [Pharmacy Med Name: hydroCHLOROthiazide 25 MG Oral Tablet] 90 tablet 3     Sig: TAKE 1 TABLET BY MOUTH IN THE  MORNING

## 2025-07-20 PROBLEM — Z00.00 MEDICARE ANNUAL WELLNESS VISIT, SUBSEQUENT: Status: RESOLVED | Noted: 2025-06-20 | Resolved: 2025-07-20

## 2025-07-28 DIAGNOSIS — E03.9 ACQUIRED HYPOTHYROIDISM: ICD-10-CM

## 2025-07-28 NOTE — TELEPHONE ENCOUNTER
Medication:   Requested Prescriptions     Pending Prescriptions Disp Refills    levothyroxine (SYNTHROID) 50 MCG tablet [Pharmacy Med Name: Levothyroxine Sodium 50 MCG Oral Tablet] 90 tablet 3     Sig: TAKE 1 TABLET BY MOUTH DAILY     Last Filled:  02/25/2025    Last appt: 6/17/2025   Next appt: 8/18/2025    Last OARRS:        No data to display

## 2025-07-30 RX ORDER — LEVOTHYROXINE SODIUM 50 UG/1
50 TABLET ORAL DAILY
Qty: 90 TABLET | Refills: 0 | Status: SHIPPED | OUTPATIENT
Start: 2025-07-30

## 2025-08-01 NOTE — OP NOTE
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]    Post-Op Diagnosis: Same       Procedure(s):  Robotic / Laparoscopic liver cyst excision    Surgeon(s):  Kashmir Hernández MD    Assistant:   Surgical Assistant: Zaid Armstrong  Resident: Roger Ruiz DO    Anesthesia: General    Estimated Blood Loss (mL): Minimal    Complications: None    Specimens:   ID Type Source Tests Collected by Time Destination   A : LIVER CYST Tissue Tissue SURGICAL PATHOLOGY Kashmir Hernández MD 6/23/2025 1125        Implants:  * No implants in log *      Drains:   [REMOVED] NG/OG/NJ/NE Tube Orogastric 18 fr Right mouth (Removed)       [REMOVED] Urinary Catheter 06/23/25 Jordan (Removed)   Output (mL) 350 mL 06/23/25 1430       [REMOVED] Urinary Catheter 06/23/25 Coude (Removed)   Site Assessment Parachute 06/23/25 1616   Urine Color Bloody 06/23/25 1616   Urine Appearance Clear 06/23/25 1616   Collection Container Standard 06/23/25 1616   Securement Method Securing device (Describe) 06/23/25 1616       Findings:  Present At Time Of Surgery (PATOS) (choose all levels that have infection present):  No infection present  Other Findings: Evacuation of hepatic liver cyst. Contents looked consistent with old hematoma. Contents evacuated. Resection of wall of liver cyst and sent to pathology.     OPERATIVE INDICATIONS: Patient is a 80 yr old diagnosed with symptomatic liver cysts. Risks, benefits and alternatives for the procedure were explained to the patient. Patient wishes to proceed with surgery.       OPERATIVE PROCEDURE: Patient was identified in the preoperative area and brought to the operating room. General endotracheal anesthesia was given. Patients abdomen was prepped and draped in a sterile manner. Open incision was marked. A small incision was made in the left upper quadrant. Abdominal

## 2025-08-18 ENCOUNTER — OFFICE VISIT (OUTPATIENT)
Dept: PRIMARY CARE CLINIC | Age: 80
End: 2025-08-18
Payer: MEDICARE

## 2025-08-18 VITALS
WEIGHT: 244 LBS | BODY MASS INDEX: 34.93 KG/M2 | TEMPERATURE: 96.9 F | DIASTOLIC BLOOD PRESSURE: 72 MMHG | RESPIRATION RATE: 16 BRPM | SYSTOLIC BLOOD PRESSURE: 118 MMHG | HEIGHT: 70 IN | OXYGEN SATURATION: 95 % | HEART RATE: 66 BPM

## 2025-08-18 DIAGNOSIS — E11.22 TYPE 2 DIABETES MELLITUS WITH STAGE 3A CHRONIC KIDNEY DISEASE, WITHOUT LONG-TERM CURRENT USE OF INSULIN (HCC): Primary | ICD-10-CM

## 2025-08-18 DIAGNOSIS — E78.2 MIXED HYPERLIPIDEMIA: ICD-10-CM

## 2025-08-18 DIAGNOSIS — E03.9 ACQUIRED HYPOTHYROIDISM: ICD-10-CM

## 2025-08-18 DIAGNOSIS — I10 ESSENTIAL HYPERTENSION: ICD-10-CM

## 2025-08-18 DIAGNOSIS — N18.31 TYPE 2 DIABETES MELLITUS WITH STAGE 3A CHRONIC KIDNEY DISEASE, WITHOUT LONG-TERM CURRENT USE OF INSULIN (HCC): Primary | ICD-10-CM

## 2025-08-18 LAB — HBA1C MFR BLD: 6.5 %

## 2025-08-18 PROCEDURE — 3044F HG A1C LEVEL LT 7.0%: CPT | Performed by: INTERNAL MEDICINE

## 2025-08-18 PROCEDURE — 83036 HEMOGLOBIN GLYCOSYLATED A1C: CPT | Performed by: INTERNAL MEDICINE

## 2025-08-18 PROCEDURE — 1160F RVW MEDS BY RX/DR IN RCRD: CPT | Performed by: INTERNAL MEDICINE

## 2025-08-18 PROCEDURE — G8417 CALC BMI ABV UP PARAM F/U: HCPCS | Performed by: INTERNAL MEDICINE

## 2025-08-18 PROCEDURE — 3078F DIAST BP <80 MM HG: CPT | Performed by: INTERNAL MEDICINE

## 2025-08-18 PROCEDURE — G2211 COMPLEX E/M VISIT ADD ON: HCPCS | Performed by: INTERNAL MEDICINE

## 2025-08-18 PROCEDURE — 1159F MED LIST DOCD IN RCRD: CPT | Performed by: INTERNAL MEDICINE

## 2025-08-18 PROCEDURE — 3074F SYST BP LT 130 MM HG: CPT | Performed by: INTERNAL MEDICINE

## 2025-08-18 PROCEDURE — 1036F TOBACCO NON-USER: CPT | Performed by: INTERNAL MEDICINE

## 2025-08-18 PROCEDURE — G8427 DOCREV CUR MEDS BY ELIG CLIN: HCPCS | Performed by: INTERNAL MEDICINE

## 2025-08-18 PROCEDURE — 99214 OFFICE O/P EST MOD 30 MIN: CPT | Performed by: INTERNAL MEDICINE

## 2025-08-18 PROCEDURE — 1123F ACP DISCUSS/DSCN MKR DOCD: CPT | Performed by: INTERNAL MEDICINE

## 2025-08-18 RX ORDER — AMLODIPINE BESYLATE 5 MG/1
5 TABLET ORAL DAILY
Qty: 90 TABLET | Refills: 0 | Status: SHIPPED | OUTPATIENT
Start: 2025-08-18

## 2025-08-18 RX ORDER — LOSARTAN POTASSIUM 25 MG/1
25 TABLET ORAL DAILY
Qty: 90 TABLET | Refills: 1 | Status: SHIPPED | OUTPATIENT
Start: 2025-08-18

## 2025-08-18 SDOH — ECONOMIC STABILITY: FOOD INSECURITY: WITHIN THE PAST 12 MONTHS, THE FOOD YOU BOUGHT JUST DIDN'T LAST AND YOU DIDN'T HAVE MONEY TO GET MORE.: NEVER TRUE

## 2025-08-18 SDOH — ECONOMIC STABILITY: FOOD INSECURITY: WITHIN THE PAST 12 MONTHS, YOU WORRIED THAT YOUR FOOD WOULD RUN OUT BEFORE YOU GOT MONEY TO BUY MORE.: NEVER TRUE

## 2025-08-18 ASSESSMENT — PATIENT HEALTH QUESTIONNAIRE - PHQ9
SUM OF ALL RESPONSES TO PHQ QUESTIONS 1-9: 0
SUM OF ALL RESPONSES TO PHQ QUESTIONS 1-9: 0
1. LITTLE INTEREST OR PLEASURE IN DOING THINGS: NOT AT ALL
SUM OF ALL RESPONSES TO PHQ QUESTIONS 1-9: 0
SUM OF ALL RESPONSES TO PHQ QUESTIONS 1-9: 0
2. FEELING DOWN, DEPRESSED OR HOPELESS: NOT AT ALL

## 2025-08-26 ASSESSMENT — ENCOUNTER SYMPTOMS
DIARRHEA: 0
CHEST TIGHTNESS: 0
ABDOMINAL PAIN: 0
VOMITING: 0
TROUBLE SWALLOWING: 0
SORE THROAT: 0
RHINORRHEA: 0
SHORTNESS OF BREATH: 0
CONSTIPATION: 0
COUGH: 0
WHEEZING: 0
NAUSEA: 0

## (undated) DEVICE — SYSTEM SMK EVAC LAP TBNG FILTER HSNG BENT STYL PNK SEE CLR

## (undated) DEVICE — SUTURE PERMA-HAND SZ 2-0 L30IN NONABSORBABLE BLK L26MM SH K833H

## (undated) DEVICE — NEEDLE HYPO 22GA L15IN REG BVL TIP STD DISPOSABLE

## (undated) DEVICE — FOLEY TRAY 1 LAYR 16 FR 10 CC URIMTR SNAPSECURE URO175816S

## (undated) DEVICE — SYRINGE MED 10ML LUERLOCK TIP W/O SFTY DISP

## (undated) DEVICE — OBTURATOR ROBOTIC DIA8MM STD OPT BLDELSS

## (undated) DEVICE — AGENT HEMSTAT 3GM OXIDIZED REGENERATED CELOS ABSRB FOR CONT (ORDER MULTIPLES OF 5EA)

## (undated) DEVICE — FORCEPS SURG DIA8MM BPLR MARYLAND ENDOWRIST

## (undated) DEVICE — APPLIER CLP L DIA8MM HEM-O-LOK ENDOWRIST

## (undated) DEVICE — DRAPE ARM W21XH19XL10.5IN DA VINCI XI INTUITIVE SURGICAL

## (undated) DEVICE — GLOVE ORANGE PI 7   MSG9070

## (undated) DEVICE — SEAL UNIVERSAL 5-12MM

## (undated) DEVICE — SOLUTION INJ LR VISIV 1000ML BG

## (undated) DEVICE — TRAP FLUID

## (undated) DEVICE — ADHESIVE SKIN CLOSURE TOP 0.8 CC PREM PUR LIQUIBAND RAPID LF

## (undated) DEVICE — TOWEL MSG G N WVN STP FLAG

## (undated) DEVICE — GARMENT COMPR M FOR 12-18IN CALF INTMIT SGL BLDR HEMO-FORCE

## (undated) DEVICE — FORCEPS SURG DIA8MM ROBOTIC CADIERE ENDOWRIST

## (undated) DEVICE — SOLUTION ANTIFOG VIS SYS CLEARIFY LAPSCP

## (undated) DEVICE — PUMP SUC IRR TBNG L10FT W/ HNDPC ASSEMB STRYKEFLOW 2

## (undated) DEVICE — SUTURE MONOCRYL + SZ 4-0 L27IN ABSRB UD L19MM PS-2 3/8 CIR MCP426H

## (undated) DEVICE — TROCAR LAP L100MM DIA5MM ABD Z-THREAD 1ST ENTRY KII FIOS

## (undated) DEVICE — SEALER/DIVIDER LAP SHFT L37CM JAW APER 11.4MM 315DEG ROT

## (undated) DEVICE — Device

## (undated) DEVICE — DRAPE INCIS W23XL23IN FAB ANTIMIC IOBAN 2

## (undated) DEVICE — SUTURE PERMA-HAND SZ 3-0 L30IN NONABSORBABLE BLK L17MM RB-1 K872H

## (undated) DEVICE — COVER TIP DIA8MM DA VINCI SI XI X ENDOWRIST

## (undated) DEVICE — APPLICATOR MEDICATED 26 CC SOLUTION HI LT ORNG CHLORAPREP

## (undated) DEVICE — SYSTEM RETRV 5MM PRT UNIV INZII

## (undated) DEVICE — PROTECTOR EYE AD FOAM RIG IGUARD

## (undated) DEVICE — GLOVE SURG SZ 7 L12IN FNGR THK79MIL GRN LTX FREE

## (undated) DEVICE — SCISSORS SURG DIA8MM MPLR CRV ENDOWRIST